# Patient Record
Sex: FEMALE | Race: BLACK OR AFRICAN AMERICAN
[De-identification: names, ages, dates, MRNs, and addresses within clinical notes are randomized per-mention and may not be internally consistent; named-entity substitution may affect disease eponyms.]

---

## 2017-01-16 ENCOUNTER — HOSPITAL ENCOUNTER (EMERGENCY)
Dept: HOSPITAL 17 - NEPB | Age: 27
Discharge: HOME | End: 2017-01-16
Payer: MEDICAID

## 2017-01-16 VITALS — DIASTOLIC BLOOD PRESSURE: 63 MMHG | SYSTOLIC BLOOD PRESSURE: 121 MMHG | TEMPERATURE: 98.2 F

## 2017-01-16 VITALS — BODY MASS INDEX: 30.22 KG/M2 | WEIGHT: 149.91 LBS | HEIGHT: 59 IN

## 2017-01-16 VITALS
HEART RATE: 71 BPM | DIASTOLIC BLOOD PRESSURE: 76 MMHG | SYSTOLIC BLOOD PRESSURE: 119 MMHG | OXYGEN SATURATION: 100 % | RESPIRATION RATE: 16 BRPM | TEMPERATURE: 98.3 F

## 2017-01-16 DIAGNOSIS — J06.9: Primary | ICD-10-CM

## 2017-01-16 PROCEDURE — 99284 EMERGENCY DEPT VISIT MOD MDM: CPT

## 2017-01-16 NOTE — PD
HPI


Chief Complaint:  Cold / Flu Symptoms


Time Seen by Provider:  21:12


Travel History


International Travel<30 days:  No


Contact w/Intl Traveler<30days:  No


Traveled to known affect area:  No





History of Present Illness


HPI


26-year-old black female presents from her department by EMS for evaluation of 

sore throat, difficulty swallowing and shortness of breath.  She states that 

she became sick on Friday.  She had a subjective fever.  She has had 

progressive difficulty swallowing.  She states that she had used her 's 

nasal spray without relief.  She states that it actually needed it worse.  She 

denies any significant cough or sputum production.  No nausea vomiting.  No 

abdominal pain or diarrhea.  No dysuria or frequency.





PFSH


Past Medical History


Medical History:  Denies Significant Hx


Hx Anticoagulant Therapy:  No


Cardiovascular Problems:  No


Chemotherapy:  No


Cerebrovascular Accident:  No


Diabetes:  No


Diminished Hearing:  No


Respiratory:  Yes (pna as a child)


Immunizations Current:  Yes


Tetanus Vaccination:  < 5 Years


Influenza Vaccination:  Yes


Pregnant?:  Not Pregnant


LMP:  17


:  5


Para:  4


Miscarriage:  0


:  1





Past Surgical History


 Section:  Yes





Social History


Alcohol Use:  No


Tobacco Use:  No


Substance Use:  No





Allergies-Medications


(Allergen,Severity, Reaction):  


Coded Allergies:  


     No Known Allergies (Unverified , 17)


Reported Meds & Prescriptions





Reported Meds & Active Scripts


Active








Review of Systems


Except as stated in HPI:  all other systems reviewed are Neg





Physical Exam


Narrative


GENERAL:  Well-developed, well-nourished in no acute distress. Nontoxic 

appearing.  Patient has a mild hoarse voice consistent with laryngitis.  Not 

hot potato.


HEAD: Normocephalic, atraumatic.


EYES: Pupils equal round and reactive. Extraocular motions intact. No scleral 

icterus. No injection or drainage. 


ENT: TMs clear without erythema.  The external auditory canals clear.  Nose: 

clear .  Posterior pharynx is pink and moist.  No tonsillar edema or exudate.  

Uvula midline. Airway patent.  Handling her secretions well.


NECK: Trachea midline.Supple, nontender, moves head freely.  No central bony 

tenderness or spasm.


CARDIOVASCULAR: Regular rate and rhythm without murmurs, gallops, or rubs. 


RESPIRATORY: Clear to auscultation. Breath sounds equal bilaterally. No wheezes

, rales, or rhonchi.  


GASTROINTESTINAL: Abdomen soft, non-tender, nondistended. No hepato-splenomegaly

, or palpable masses. No guarding.


EXTREMITIES: No clubbing, cyanosis, or edema. No joint tenderness, effusion, or 

edema noted. 


BACK: Nontender without deformity or crepitance. No flank tenderness.





Data


Data


Last Documented VS





Vital Signs








  Date Time  Temp Pulse Resp B/P Pulse Ox O2 Delivery O2 Flow Rate FiO2


 


17 20:30   16  100 Room Air  


 


17 20:27 98.3 71  119/76    








Orders





 Prednisone (Deltasone) (17 21:15)








[facility]


Medical Decision Making


Medical Screen Exam Complete:  Yes


Emergency Medical Condition:  Yes


Medical Record Reviewed:  Yes


Differential Diagnosis


MDM: High


Differential diagnoses: Strep throat, viral pharyngitis, mono, peritonsillar 

abscess, retropharyngeal abscess, Juvenal's angina


Narrative Course


This is a 26-year-old black female who is resting comfortably examination room.

  Her vital signs are stable.  Her exam is normal except for a minimally hoarse 

voice.  She is given prednisone 60 mg by mouth.  She is advised to use over-the-

counter Afrin.





This is URI, laryngitis





Diagnosis





 Primary Impression:  


 URI (upper respiratory infection)


 Qualified Code:  J06.9 - Viral upper respiratory tract infection


 Additional Impression:  


 Laryngitis


Patient Instructions:  General Instructions





***Additional Instructions:


Rest.


Force fluids.


Saltwater gargles.


Tylenol and Advil.


Chloraseptic Spray Cepastat lozenge.


Prednisone.


Afrin nasal spray for 4 days only.


Follow-up with a primary care doctor in 2-3 days.


Return to the ER if any problems.


***Med/Other Pt SpecificInfo:  Prescription(s) given


Scripts


Prednisone (Deltasone)20 Mg Tab20 Mg PO BID  #10 TAB


   Prov:Gracia Webb MD         17


Disposition:  01 DISCHARGE HOME


Condition:  Stable








Gil Domínguez 2017 21:17

## 2017-08-20 ENCOUNTER — HOSPITAL ENCOUNTER (INPATIENT)
Dept: HOSPITAL 17 - HOBED | Age: 27
LOS: 3 days | Discharge: LEFT BEFORE DISCHARGE | DRG: 778 | End: 2017-08-23
Attending: OBSTETRICS & GYNECOLOGY | Admitting: OBSTETRICS & GYNECOLOGY
Payer: MEDICAID

## 2017-08-20 VITALS — BODY MASS INDEX: 32.46 KG/M2 | HEIGHT: 59 IN | WEIGHT: 161 LBS

## 2017-08-20 VITALS — RESPIRATION RATE: 18 BRPM | TEMPERATURE: 98.6 F

## 2017-08-20 VITALS — DIASTOLIC BLOOD PRESSURE: 77 MMHG | SYSTOLIC BLOOD PRESSURE: 120 MMHG | HEART RATE: 89 BPM

## 2017-08-20 DIAGNOSIS — O34.219: ICD-10-CM

## 2017-08-20 DIAGNOSIS — O60.03: Primary | ICD-10-CM

## 2017-08-20 DIAGNOSIS — Z3A.32: ICD-10-CM

## 2017-08-20 DIAGNOSIS — O30.043: ICD-10-CM

## 2017-08-20 DIAGNOSIS — O40.3XX0: ICD-10-CM

## 2017-08-20 LAB
BACTERIA #/AREA URNS HPF: (no result) /HPF
COLOR UR: (no result)
COMMENT (UR): (no result)
CULTURE IF INDICATED: (no result)
GLUCOSE UR STRIP-MCNC: (no result) MG/DL
HGB UR QL STRIP: (no result)
KETONES UR STRIP-MCNC: (no result) MG/DL
NITRITE UR QL STRIP: (no result)
SP GR UR STRIP: 1.01 (ref 1–1.03)
SQUAMOUS #/AREA URNS HPF: 3 /HPF (ref 0–5)

## 2017-08-20 PROCEDURE — 87081 CULTURE SCREEN ONLY: CPT

## 2017-08-20 PROCEDURE — G0481 DRUG TEST DEF 8-14 CLASSES: HCPCS

## 2017-08-20 PROCEDURE — 81001 URINALYSIS AUTO W/SCOPE: CPT

## 2017-08-20 PROCEDURE — 59025 FETAL NON-STRESS TEST: CPT

## 2017-08-20 PROCEDURE — 82731 ASSAY OF FETAL FIBRONECTIN: CPT

## 2017-08-20 PROCEDURE — 86850 RBC ANTIBODY SCREEN: CPT

## 2017-08-20 PROCEDURE — 87150 DNA/RNA AMPLIFIED PROBE: CPT

## 2017-08-20 PROCEDURE — 80307 DRUG TEST PRSMV CHEM ANLYZR: CPT

## 2017-08-20 PROCEDURE — 86901 BLOOD TYPING SEROLOGIC RH(D): CPT

## 2017-08-20 PROCEDURE — 85025 COMPLETE CBC W/AUTO DIFF WBC: CPT

## 2017-08-20 PROCEDURE — 76816 OB US FOLLOW-UP PER FETUS: CPT

## 2017-08-20 PROCEDURE — 83735 ASSAY OF MAGNESIUM: CPT

## 2017-08-20 PROCEDURE — 86900 BLOOD TYPING SEROLOGIC ABO: CPT

## 2017-08-20 RX ADMIN — OXYTOCIN SCH MLS/HR: 10 INJECTION, SOLUTION INTRAMUSCULAR; INTRAVENOUS at 21:21

## 2017-08-20 RX ADMIN — OXYTOCIN SCH MLS/HR: 10 INJECTION, SOLUTION INTRAMUSCULAR; INTRAVENOUS at 23:41

## 2017-08-20 NOTE — PD
HPI


Travel History


International Travel<30 Days:  No


Contact w/Intl Traveler<30Days:  No


Known Affected Area:  No





History of Present Illness


HPI


This patient is a 26-year-old  6 para 5 EDC is 2017 at 32 

weeks and 1 days prenatal care with the [facility] and Dr. Charles, she 

presents the chief complaint of contractions.


The patient states the contractions began about 1 hour ago no rupture of 

membranes no vaginal bleeding the babies are active


Patient has a diamniotic dichorionic twin gestation both she states are vertex 

vertex


She has not time the contractions are very irregular hurts more when the baby 

moves improves when she is at rest


Denies any problems during the pregnancy


No headaches no blurred vision no nausea no vomiting mild diarrhea no 

constipation urinary frequency no urgency or hesitancy or feeling like she has 

a urinary tract infection


Previously admitted at [facility] for  contractions she was given 

terbutaline in the triage area 2 doses of steroids she states she got one shot 

and had to come back 24 hours later for the second shot.  This was done 

approximately 4 weeks ago, at 2 8 weeks per the patient.





Patient states that she is on Procardia 4 times a day








Prenatal records received from [facility] and reviewed EDC is 2017 placing the patient at 32 weeks and 1 day





History


Past Medical History


*** Narrative Medical


No known drug allergies history of an allergy to dust previous urinary tract 

infections


Seen for  contractions





Obstetric History


Obstetric History


First baby born 2009 female infant weight 6 lbs. 10 oz. vaginal 

delivery at term


Second baby born 2011 male infant weight 6 lbs. 4 oz. vaginal delivery 

at term


Third baby born 2012 female infant 5 lbs. 4 oz. born at 38 weeks


 #4 baby 2014 male infant weight approximately 5 pounds delivered by 

primary  secondary to nonreassuring fetal heart tracing


Fifth baby born 2016 female infant weight 6 lbs. 2 oz. repeat 





This twin gestation patient is scheduled for repeat  she states 







Past Surgical History


*** Narrative Surgical


 2





Family History


*** Narrative Family History


Father with hypertension





Social History


Alcohol Use:  No


Tobacco Use:  No


Substance Abuse:  No





Allergies-Medications


(Allergen,Severity, Reaction):  


Coded Allergies:  


     No Known Allergies (Unverified , 17)


Home Meds


Active Scripts


Prednisone (Deltasone) 20 Mg Tab, 20 MG PO BID, #10 TAB


   Prov:Gracia Webb MD         17





Review of Systems


Gastrointestinal:  Abdominal Pain (irregular contractions)





Physical Exam


Narrative


GENERAL: Well-nourished, well-developed patient.  Alert oriented 3 and 

cooperative in no acute distress


SKIN: Warm and dry.


HEAD: Normocephalic and atraumatic.


EYES: No scleral icterus. No injection or drainage. 


ENT: No nasal drainage noted. Mucous membranes pink. Airway patent.


NECK: Supple, trachea midline. No JVD.


CARDIOVASCULAR: Regular rate and rhythm without murmurs, gallops, or rubs. 


RESPIRATORY: Breath sounds equal bilaterally. No accessory muscle use.


ABDOMEN/GI: Gravid size greater than date secondary to twin gestation mild 

palpable contractions


   Gravid to [-] weeks size term size


   Fundal Height: [-]


GENITOURINARY: Speculum exam is done no fluid no blood thin white discharge 

cervix not visibly dilated


   External Genitalia: intact and normal in appearance


   BUS glands: [-]


   Cervix: [-] Posterior firm


   Dilatation: [-] Fingertip         


   Effacement: [-] Thick         


   Station: [-] First presenting part ballotable  


   Presentation: [-] Vertex/vertex per the patient       


   Membranes: [intact 


   Uterine Contractions: [-] Irregular


FHT's: 


   Category: [-]  1 


   Baseline: [-]  150/148 


   Reactive: [-]  Positive 


   Variability: [-] Moderate variability 


   Decels: [-]  0


EXTREMITIES: No cyanosis or edema.  2+ reflexes


NEUROLOGICAL: Awake and alert. Motor and sensory grossly within normal limits. 

Five out of 5 muscle strength in all muscle groups. Normal speech.





Data


Data


Vital Signs Reviewed:  Yes (blood pressures 120/77 pulse is 89 temperature is 

98.6)


Orders





 Orders


Vital Signs (Adult) .ON ADMISSION (17 21:21)


^ Labor Status (17 21:21)





MDM


Medical Record Reviewed:  Yes


Interpretation(s)


26-year-old  9 para 5035


Not in active labor


Diamniotic dichorionic twin gestation


Vertex vertex per the patient


Edward Ghosh


Rule out UTI


Rule out  labor


Narrative Course / MDM


Patient continues to have irregular contractions


mild


 states they have spaced out


will continue on the procardia 10 mg po q 6 hours


observations x 23 hours


both twins are category one


 iv fluid hydration


Visteril 50 mg po


 The cervix have been unchanged


ffn is negative 


culture not indicated on the urine





 if cervix is unchanged in the am will discharge home


Plan


Plan;


External fetal monitoring


IV fluid hydration with lactated Ringer's 125 cc an hour bolus 300 cc


Fetal fibronectin


Urinalysis





Reevaluation


Dose of Procardia 10 mg by mouth











Hazel Marquez MD Aug 20, 2017 21:35

## 2017-08-21 VITALS — SYSTOLIC BLOOD PRESSURE: 114 MMHG | HEART RATE: 104 BPM | DIASTOLIC BLOOD PRESSURE: 59 MMHG

## 2017-08-21 VITALS — SYSTOLIC BLOOD PRESSURE: 98 MMHG | HEART RATE: 86 BPM | DIASTOLIC BLOOD PRESSURE: 53 MMHG

## 2017-08-21 VITALS — RESPIRATION RATE: 18 BRPM

## 2017-08-21 VITALS — HEART RATE: 96 BPM | DIASTOLIC BLOOD PRESSURE: 62 MMHG | SYSTOLIC BLOOD PRESSURE: 113 MMHG | RESPIRATION RATE: 18 BRPM

## 2017-08-21 VITALS — DIASTOLIC BLOOD PRESSURE: 71 MMHG | SYSTOLIC BLOOD PRESSURE: 116 MMHG | HEART RATE: 103 BPM

## 2017-08-21 VITALS — DIASTOLIC BLOOD PRESSURE: 51 MMHG | HEART RATE: 93 BPM | SYSTOLIC BLOOD PRESSURE: 103 MMHG

## 2017-08-21 VITALS — SYSTOLIC BLOOD PRESSURE: 110 MMHG | DIASTOLIC BLOOD PRESSURE: 65 MMHG | HEART RATE: 94 BPM

## 2017-08-21 VITALS — DIASTOLIC BLOOD PRESSURE: 52 MMHG | HEART RATE: 101 BPM | SYSTOLIC BLOOD PRESSURE: 114 MMHG

## 2017-08-21 VITALS — SYSTOLIC BLOOD PRESSURE: 100 MMHG | DIASTOLIC BLOOD PRESSURE: 55 MMHG | HEART RATE: 92 BPM

## 2017-08-21 VITALS — SYSTOLIC BLOOD PRESSURE: 104 MMHG | DIASTOLIC BLOOD PRESSURE: 54 MMHG | HEART RATE: 74 BPM

## 2017-08-21 VITALS — SYSTOLIC BLOOD PRESSURE: 104 MMHG | DIASTOLIC BLOOD PRESSURE: 56 MMHG | HEART RATE: 97 BPM

## 2017-08-21 VITALS — DIASTOLIC BLOOD PRESSURE: 69 MMHG | HEART RATE: 102 BPM | SYSTOLIC BLOOD PRESSURE: 109 MMHG

## 2017-08-21 VITALS — HEART RATE: 98 BPM | SYSTOLIC BLOOD PRESSURE: 100 MMHG | DIASTOLIC BLOOD PRESSURE: 58 MMHG

## 2017-08-21 VITALS — SYSTOLIC BLOOD PRESSURE: 110 MMHG | HEART RATE: 95 BPM | RESPIRATION RATE: 16 BRPM | DIASTOLIC BLOOD PRESSURE: 63 MMHG

## 2017-08-21 VITALS — SYSTOLIC BLOOD PRESSURE: 103 MMHG | HEART RATE: 85 BPM | DIASTOLIC BLOOD PRESSURE: 59 MMHG

## 2017-08-21 VITALS — HEART RATE: 95 BPM | SYSTOLIC BLOOD PRESSURE: 96 MMHG | DIASTOLIC BLOOD PRESSURE: 42 MMHG

## 2017-08-21 VITALS — DIASTOLIC BLOOD PRESSURE: 44 MMHG | SYSTOLIC BLOOD PRESSURE: 105 MMHG | HEART RATE: 114 BPM

## 2017-08-21 VITALS — DIASTOLIC BLOOD PRESSURE: 67 MMHG | SYSTOLIC BLOOD PRESSURE: 109 MMHG | HEART RATE: 97 BPM

## 2017-08-21 VITALS — TEMPERATURE: 98.5 F | SYSTOLIC BLOOD PRESSURE: 111 MMHG | DIASTOLIC BLOOD PRESSURE: 50 MMHG | HEART RATE: 93 BPM

## 2017-08-21 VITALS — SYSTOLIC BLOOD PRESSURE: 107 MMHG | HEART RATE: 96 BPM | DIASTOLIC BLOOD PRESSURE: 60 MMHG

## 2017-08-21 VITALS — DIASTOLIC BLOOD PRESSURE: 65 MMHG | HEART RATE: 93 BPM | SYSTOLIC BLOOD PRESSURE: 103 MMHG

## 2017-08-21 VITALS — DIASTOLIC BLOOD PRESSURE: 42 MMHG | SYSTOLIC BLOOD PRESSURE: 96 MMHG | HEART RATE: 93 BPM

## 2017-08-21 VITALS — DIASTOLIC BLOOD PRESSURE: 59 MMHG | SYSTOLIC BLOOD PRESSURE: 110 MMHG | HEART RATE: 95 BPM

## 2017-08-21 VITALS — RESPIRATION RATE: 16 BRPM | TEMPERATURE: 98.8 F

## 2017-08-21 VITALS — SYSTOLIC BLOOD PRESSURE: 112 MMHG | DIASTOLIC BLOOD PRESSURE: 57 MMHG | HEART RATE: 85 BPM

## 2017-08-21 VITALS — SYSTOLIC BLOOD PRESSURE: 115 MMHG | DIASTOLIC BLOOD PRESSURE: 59 MMHG | HEART RATE: 85 BPM

## 2017-08-21 VITALS — DIASTOLIC BLOOD PRESSURE: 62 MMHG | SYSTOLIC BLOOD PRESSURE: 105 MMHG | HEART RATE: 82 BPM

## 2017-08-21 VITALS — SYSTOLIC BLOOD PRESSURE: 102 MMHG | HEART RATE: 101 BPM | DIASTOLIC BLOOD PRESSURE: 51 MMHG

## 2017-08-21 VITALS — DIASTOLIC BLOOD PRESSURE: 62 MMHG | HEART RATE: 96 BPM | SYSTOLIC BLOOD PRESSURE: 107 MMHG

## 2017-08-21 VITALS — TEMPERATURE: 98 F

## 2017-08-21 VITALS — HEART RATE: 99 BPM | DIASTOLIC BLOOD PRESSURE: 44 MMHG | SYSTOLIC BLOOD PRESSURE: 107 MMHG

## 2017-08-21 VITALS — HEART RATE: 89 BPM | DIASTOLIC BLOOD PRESSURE: 56 MMHG | SYSTOLIC BLOOD PRESSURE: 106 MMHG

## 2017-08-21 VITALS — SYSTOLIC BLOOD PRESSURE: 107 MMHG | HEART RATE: 96 BPM | DIASTOLIC BLOOD PRESSURE: 52 MMHG

## 2017-08-21 VITALS — TEMPERATURE: 97.7 F

## 2017-08-21 VITALS — HEART RATE: 89 BPM | RESPIRATION RATE: 18 BRPM | SYSTOLIC BLOOD PRESSURE: 108 MMHG | DIASTOLIC BLOOD PRESSURE: 63 MMHG

## 2017-08-21 LAB
BASOPHILS # BLD AUTO: 0.1 TH/MM3 (ref 0–0.2)
BASOPHILS NFR BLD: 0.6 % (ref 0–2)
EOSINOPHIL # BLD: 0.7 TH/MM3 (ref 0–0.4)
EOSINOPHIL NFR BLD: 8 % (ref 0–4)
ERYTHROCYTE [DISTWIDTH] IN BLOOD BY AUTOMATED COUNT: 14.6 % (ref 11.6–17.2)
HCT VFR BLD CALC: 30.1 % (ref 35–46)
HEMO FLAGS: (no result)
LYMPHOCYTES # BLD AUTO: 1.4 TH/MM3 (ref 1–4.8)
LYMPHOCYTES NFR BLD AUTO: 16.3 % (ref 9–44)
MCH RBC QN AUTO: 28.4 PG (ref 27–34)
MCHC RBC AUTO-ENTMCNC: 32.2 % (ref 32–36)
MCV RBC AUTO: 88.3 FL (ref 80–100)
MONOCYTES NFR BLD: 11.4 % (ref 0–8)
NEUTROPHILS # BLD AUTO: 5.6 TH/MM3 (ref 1.8–7.7)
NEUTROPHILS NFR BLD AUTO: 63.7 % (ref 16–70)
PLATELET # BLD: 132 TH/MM3 (ref 150–450)
RBC # BLD AUTO: 3.41 MIL/MM3 (ref 4–5.3)
WBC # BLD AUTO: 8.8 TH/MM3 (ref 4–11)

## 2017-08-21 RX ADMIN — BETAMETHASONE ACETATE AND BETAMETHASONE SODIUM PHOSPHATE SCH MG: 3; 3 INJECTION, SUSPENSION INTRA-ARTICULAR; INTRALESIONAL; INTRAMUSCULAR; SOFT TISSUE at 02:11

## 2017-08-21 RX ADMIN — OXYTOCIN SCH MLS/HR: 10 INJECTION, SOLUTION INTRAMUSCULAR; INTRAVENOUS at 15:50

## 2017-08-21 RX ADMIN — OXYTOCIN SCH MLS/HR: 10 INJECTION, SOLUTION INTRAMUSCULAR; INTRAVENOUS at 01:51

## 2017-08-21 RX ADMIN — OXYTOCIN SCH MLS/HR: 10 INJECTION, SOLUTION INTRAMUSCULAR; INTRAVENOUS at 05:21

## 2017-08-21 RX ADMIN — Medication SCH ML: at 21:00

## 2017-08-21 RX ADMIN — OXYTOCIN SCH MLS/HR: 10 INJECTION, SOLUTION INTRAMUSCULAR; INTRAVENOUS at 01:59

## 2017-08-21 RX ADMIN — Medication SCH ML: at 09:00

## 2017-08-21 RX ADMIN — OXYTOCIN SCH MLS/HR: 10 INJECTION, SOLUTION INTRAMUSCULAR; INTRAVENOUS at 09:51

## 2017-08-21 RX ADMIN — MAGNESIUM SULFATE IN WATER SCH MLS/HR: 40 INJECTION, SOLUTION INTRAVENOUS at 22:13

## 2017-08-21 RX ADMIN — MAGNESIUM SULFATE IN WATER SCH MLS/HR: 40 INJECTION, SOLUTION INTRAVENOUS at 01:51

## 2017-08-21 NOTE — HHI.PR
Subjective


Remarks


Patient complaining of increase in the contractions.  They have become more 

regular q 2  min





Objective


-


the cervix is not more dilated still 1 cm more effaced now 75 % ballotable 

vertex on the first twin


Result Diagram:  


17 2350








A/P


Assessment and Plan


33 weeks


twin gestation


 contractions





plan:


will start magnesium sulfate


    4 gram loading dose


    2 grams per hour


fentanyl for pain 50 mg.


Hernandez cath for monitoring of urine out put\


close monitoring











Hazel Marquez MD Aug 21, 2017 01:42

## 2017-08-21 NOTE — PD.OB.ANTE
Subjective


Interval History


No acute issues overnight.  Vitals are stable, patient remains afebrile.  She 

is feeling painful contractions every 5-10 minutes.  She is feeling increasing 

pelvic pressure.  She denies any fever, chills, chest pain, shortness of breath

, leg pain.  She does feel the urge to have a bowel movement.


Antepartum ROS:  Reports: New complaints (pelvic pressure/contractions), Fetal 

movement normal, Contractions, 


   Denies: Loss of fluid, Vaginal bleeding (Enid Butler MD, R3)





Objective


Lab & Micro Results











Test


  17


21:15 17


23:50


 


Urine Color LIGHT-YELLOW  


 


Urine Turbidity HAZY  


 


Urine pH 6.0  


 


Urine Specific Gravity 1.008  


 


Urine Protein NEG mg/dL  


 


Urine Glucose (UA) NEG mg/dL  


 


Urine Ketones NEG mg/dL  


 


Urine Occult Blood NEG  


 


Urine Nitrite NEG  


 


Urine Bilirubin NEG  


 


Urine Urobilinogen


  LESS THAN 2.0


MG/DL 


 


 


Urine Leukocyte Esterase LARGE  


 


Urine RBC 2 /hpf  


 


Urine WBC 5 /hpf  


 


Urine Squamous Epithelial


Cells 3 /hpf 


  


 


 


Urine Amorphous Sediment RARE  


 


Urine Bacteria OCC /hpf  


 


Microscopic Urinalysis Comment


  CULT NOT


INDICATED 


 


 


Fetal Fibronectin NEGATIVE  


 


Urine Opiates Screen NEG  


 


Urine Barbiturates Screen NEG  


 


Urine Amphetamines Screen NEG  


 


Urine Benzodiazepines Screen NEG  


 


Urine Cocaine Screen NEG  


 


Urine Cannabinoids Screen NEG  


 


White Blood Count  8.8 TH/MM3 


 


Red Blood Count  3.41 MIL/MM3 


 


Hemoglobin  9.7 GM/DL 


 


Hematocrit  30.1 % 


 


Mean Corpuscular Volume  88.3 FL 


 


Mean Corpuscular Hemoglobin  28.4 PG 


 


Mean Corpuscular Hemoglobin


Concent 


  32.2 % 


 


 


Red Cell Distribution Width  14.6 % 


 


Platelet Count  132 TH/MM3 


 


Mean Platelet Volume  11.1 FL 


 


Neutrophils (%) (Auto)  63.7 % 


 


Lymphocytes (%) (Auto)  16.3 % 


 


Monocytes (%) (Auto)  11.4 % 


 


Eosinophils (%) (Auto)  8.0 % 


 


Basophils (%) (Auto)  0.6 % 


 


Neutrophils # (Auto)  5.6 TH/MM3 


 


Lymphocytes # (Auto)  1.4 TH/MM3 


 


Monocytes # (Auto)  1.0 TH/MM3 


 


Eosinophils # (Auto)  0.7 TH/MM3 


 


Basophils # (Auto)  0.1 TH/MM3 


 


CBC Comment  DIFF FINAL 


 


Differential Comment   








Physical Exam


GENERAL: Well-nourished, well-developed patient.


CARDIOVASCULAR: Regular rate and rhythm without murmurs, gallops, or rubs.


RESPIRATORY: Breath sounds equal bilaterally. No accessory muscle use.


ABDOMEN/GI: Abdomen soft, non-tender.


  Fundus: 32


GENITOURINARY:


External Genitalia: intact and normal in appearance


  Cervix: anterior


  Dilatation: 1-2


  Effacement: 70


  Station: -2


  Presentation: vertex


  Membranes: intact


  Uterine Contractions: q7-8min


FHT's:


TWIN A


  Category: I 


  Baseline: 135


  Reactive: +


  Variability: moderate


  Decels: none


TWIN B


  Category: I 


  Baseline: 135


  Reactive: +


  Variability: moderate


  Decels: none


EXTREMITIES: No cyanosis or edema, non-tender, without signs of DVT.


 (Enid Butler MD, R3)





Assessment and Plan


Problem List:  


(1) Twin gestation in third trimester


ICD Codes:  O30.003 - Twin pregnancy, unspecified number of placenta and 

unspecified number of amniotic sacs, third trimester


Qualifiers:  


   Qualified Codes:  O30.043 - Twin pregnancy, dichorionic/diamniotic, third 

trimester


(2)  labor in third trimester


ICD Codes:  O60.03 -  labor without delivery, third trimester


Qualifiers:  


   Qualified Codes:  O60.03 -  labor without delivery, third trimester


Assessment and Plan


26 year old  at 32/2/7 weeks gestation





1. IUP- twin gestation, Category I tracing on both twins, reassuring.


2.  Labor- 


-Regular contractions 


-Cervical change noted, now at 1-/-1


-Continue Magnesium Sulfate for fetal neuroprotection


-Obtain US for EFW and position


-Betamethasone 12mg IM Q24H x 2 (first dose given  at 0211)


-Continue Procardia 10mg PO Q6H


-Continue Penicillin per protocol for GBS prophylaxis. Will obtain GBS PCR 

today.


- s/p  x 2, will need a repeat  as labor progresses. Will 

continue to monitor closely





sdw Dr. Aldridge and Dr. Magallon R1


 (Enid Butler MD, R3)





Addendum


Remarks


I rounded on the patient. I rounded with the resident. I reviewed the resident'

s assessment and plan of care for this patient. I am in agreement with the plan 

of care for this patient.


 (Hazel Marquez MD)











Enid Butler MD, R3 Aug 21, 2017 08:09


Hazel Marquez MD Aug 21, 2017 08:56

## 2017-08-22 VITALS — HEART RATE: 91 BPM | SYSTOLIC BLOOD PRESSURE: 93 MMHG | DIASTOLIC BLOOD PRESSURE: 40 MMHG

## 2017-08-22 VITALS — HEART RATE: 80 BPM | DIASTOLIC BLOOD PRESSURE: 56 MMHG | SYSTOLIC BLOOD PRESSURE: 115 MMHG

## 2017-08-22 VITALS — DIASTOLIC BLOOD PRESSURE: 55 MMHG | SYSTOLIC BLOOD PRESSURE: 103 MMHG | RESPIRATION RATE: 18 BRPM | HEART RATE: 78 BPM

## 2017-08-22 VITALS — DIASTOLIC BLOOD PRESSURE: 46 MMHG | SYSTOLIC BLOOD PRESSURE: 111 MMHG | HEART RATE: 100 BPM

## 2017-08-22 VITALS — DIASTOLIC BLOOD PRESSURE: 64 MMHG | SYSTOLIC BLOOD PRESSURE: 106 MMHG | RESPIRATION RATE: 16 BRPM | HEART RATE: 93 BPM

## 2017-08-22 VITALS — HEART RATE: 85 BPM | DIASTOLIC BLOOD PRESSURE: 48 MMHG | SYSTOLIC BLOOD PRESSURE: 99 MMHG

## 2017-08-22 VITALS — HEART RATE: 83 BPM | SYSTOLIC BLOOD PRESSURE: 106 MMHG | DIASTOLIC BLOOD PRESSURE: 58 MMHG

## 2017-08-22 VITALS — SYSTOLIC BLOOD PRESSURE: 109 MMHG | RESPIRATION RATE: 18 BRPM | HEART RATE: 76 BPM | DIASTOLIC BLOOD PRESSURE: 58 MMHG

## 2017-08-22 VITALS — RESPIRATION RATE: 18 BRPM | SYSTOLIC BLOOD PRESSURE: 96 MMHG | HEART RATE: 89 BPM | DIASTOLIC BLOOD PRESSURE: 53 MMHG

## 2017-08-22 VITALS — SYSTOLIC BLOOD PRESSURE: 109 MMHG | HEART RATE: 79 BPM | DIASTOLIC BLOOD PRESSURE: 59 MMHG

## 2017-08-22 VITALS
HEART RATE: 95 BPM | DIASTOLIC BLOOD PRESSURE: 47 MMHG | TEMPERATURE: 98.1 F | SYSTOLIC BLOOD PRESSURE: 101 MMHG | RESPIRATION RATE: 18 BRPM

## 2017-08-22 VITALS — HEART RATE: 78 BPM | DIASTOLIC BLOOD PRESSURE: 58 MMHG | SYSTOLIC BLOOD PRESSURE: 104 MMHG

## 2017-08-22 VITALS — DIASTOLIC BLOOD PRESSURE: 60 MMHG | SYSTOLIC BLOOD PRESSURE: 101 MMHG | HEART RATE: 83 BPM

## 2017-08-22 VITALS — RESPIRATION RATE: 17 BRPM | TEMPERATURE: 98 F

## 2017-08-22 VITALS — HEART RATE: 81 BPM | SYSTOLIC BLOOD PRESSURE: 101 MMHG | DIASTOLIC BLOOD PRESSURE: 60 MMHG

## 2017-08-22 VITALS — SYSTOLIC BLOOD PRESSURE: 106 MMHG | DIASTOLIC BLOOD PRESSURE: 58 MMHG | HEART RATE: 77 BPM

## 2017-08-22 VITALS — HEART RATE: 101 BPM | DIASTOLIC BLOOD PRESSURE: 49 MMHG | SYSTOLIC BLOOD PRESSURE: 100 MMHG

## 2017-08-22 VITALS — SYSTOLIC BLOOD PRESSURE: 95 MMHG | DIASTOLIC BLOOD PRESSURE: 50 MMHG | HEART RATE: 90 BPM

## 2017-08-22 VITALS — HEART RATE: 82 BPM | DIASTOLIC BLOOD PRESSURE: 53 MMHG | SYSTOLIC BLOOD PRESSURE: 100 MMHG

## 2017-08-22 VITALS — RESPIRATION RATE: 18 BRPM | TEMPERATURE: 98 F

## 2017-08-22 VITALS — RESPIRATION RATE: 16 BRPM

## 2017-08-22 VITALS — RESPIRATION RATE: 18 BRPM

## 2017-08-22 RX ADMIN — Medication SCH ML: at 09:00

## 2017-08-22 RX ADMIN — BETAMETHASONE ACETATE AND BETAMETHASONE SODIUM PHOSPHATE SCH MG: 3; 3 INJECTION, SUSPENSION INTRA-ARTICULAR; INTRALESIONAL; INTRAMUSCULAR; SOFT TISSUE at 02:02

## 2017-08-22 RX ADMIN — Medication SCH ML: at 21:00

## 2017-08-22 NOTE — PD.OB.ANTE
Subjective


Diagnosis:  


(1) Twin gestation in third trimester


(2)  labor in third trimester


Interval History


No acute issues overnight.  Vitals are stable, patient remains afebrile.  She 

continues to note about 3 contractions every hour.  She denies any vaginal 

bleeding or discharge. No gush or leaking of fluid.  Positive fetal movement.  

No chest pain, shortness of breath, or leg pain.


Antepartum ROS:  Reports: Fetal movement normal, Contractions, 


   Denies: New complaints, Loss of fluid, Vaginal bleeding





Objective


Vital Signs





Vital Signs








  Date Time  Temp Pulse Resp B/P (MAP) Pulse Ox O2 Delivery O2 Flow Rate FiO2


 


17 06:00   16     


 


17 06:00  93  106/64 (78)    


 


17 05:00  83  101/60 (74)    


 


17 04:03  80  115/56 (75)    


 


17 04:00   18     


 


17 03:00  82  100/53 (69)    


 


17 02:00   18     


 


17 02:00  89  96/53 (67)    


 


17 01:00  91  93/40 (57)    


 


17 00:00  101  100/49 (66)    


 


17 23:37   18     


 


17 23:00 98.5 93  111/50 (70)    


 


17 22:00  96  107/52 (70)    


 


17 21:56   18     


 


17 21:00  95  110/59 (76)    


 


17 20:00  96  107/60 (76)    


 


17 19:27   18     


 


17 19:00  101  114/52 (72)    


 


17 18:00  101  102/51 (68)    


 


17 17:00  99  107/44 (65)    


 


17 16:00  93  96/42 (60)    


 


17 15:01  97  109/67 (81)    


 


17 14:59 98.8  16     


 


17 14:00  94  110/65 (80)    


 


17 13:00  96  113/62 (79)    


 


17 13:00   18     


 


17 12:00  93  103/51 (68)    


 


17 11:03  102  109/69 (82)    


 


17 10:36  95 16 110/63 (79)    


 


17 08:20 97.7       


 


17 08:00  103  116/71 (86)    


 


17 07:42  104  114/59 (77)    


 


17 07:41   18     








Lab & Micro Results











Test


  17


08:20


 


Group B Streptococcus (PCR) NEGATIVE 














 Date/Time


Source Procedure


Growth Status


 


 


 17 08:20


Genital Genital Region Group B Streptococcus Screen


Pending Received








Physical Exam


GENERAL: Well-nourished, well-developed patient.


CARDIOVASCULAR: Regular rate and rhythm without murmurs, gallops, or rubs.


RESPIRATORY: Breath sounds equal bilaterally. No accessory muscle use.


ABDOMEN/GI: Abdomen soft, non-tender.


  Fundus: 32


GENITOURINARY:


External Genitalia: intact and normal in appearance


  Cervix: [-]


  Dilatation: [-]


  Effacement: [-]


  Station: [-]


  Presentation: vertex-vertex


  Membranes: intact


  Uterine Contractions: Q20min


FHT's Twin A:


  Category: I


  Baseline: 130


  Reactive: +


  Variability: moderate


  Decels: none


FHT's Twin B:


  Category: I


  Baseline: 135


  Reactive: +


  Variability: moderate


  Decels: none


EXTREMITIES: No cyanosis or edema, non-tender, without signs of DVT.





Assessment and Plan


Problem List:  


(1) Twin gestation in third trimester


ICD Codes:  O30.003 - Twin pregnancy, unspecified number of placenta and 

unspecified number of amniotic sacs, third trimester


Qualifiers:  


   Qualified Codes:  O30.043 - Twin pregnancy, dichorionic/diamniotic, third 

trimester


(2)  labor in third trimester


ICD Codes:  O60.03 -  labor without delivery, third trimester


Qualifiers:  


   Qualified Codes:  O60.03 -  labor without delivery, third trimester


Assessment and Plan


26 year old  at 32-3/7 weeks gestation





1. IUP- twin gestation, Category I tracing on both twins, reassuring.


2.  Labor 


-Contractions have decreased in frequency


-Will DC Magnesium Sulfate


- US shows Twin A: Cephalic, AGA, EFW 1744g, Twin B: Cephalic, AGA, EFW 

1772g


-s/p Betamethasone 12mg IM Q24H x 2 (-)


-GBS negative


-s/p  x 2, will need a repeat  if labor progresses. Will 

continue to monitor closely





dw Dr. Pryor and Dr. Magallon R1











Enid Butler MD, R3 Aug 22, 2017 07:14

## 2017-08-22 NOTE — PD.OB.ANTE
Subjective


Diagnosis:  


(1) Twin gestation in third trimester


(2)  labor in third trimester


(3) 32 weeks gestation of pregnancy


Interval History


Patient reports irregular contractions.  Reports good fetal movement x 2.





Objective


Vital Signs





Vital Signs








  Date Time  Temp Pulse Resp B/P (MAP) Pulse Ox O2 Delivery O2 Flow Rate FiO2


 


17 22:00  79  109/59 (76)    


 


17 21:35  81  101/60 (74)    


 


17 21:30  77  106/58 (74)    


 


17 21:25  83  106/58 (74)    


 


17 21:20  78  104/58 (73)    


 


17 21:15  76 18 109/58 (75)    


 


17 21:00 98.0       


 


17 21:00   18     


 


17 15:47  90  95/50 (65)    


 


17 15:46 98.0  17     


 


17 12:50   18     


 


17 12:50  95  101/47 (65)    


 


17 12:50 98.1       


 


17 08:00   16     


 


17 07:56  100  111/46 (67)    


 


17 07:00  85  99/48 (65)    


 


17 06:00   16     


 


17 06:00  93  106/64 (78)    


 


17 05:00  83  101/60 (74)    


 


17 04:03  80  115/56 (75)    


 


17 04:00   18     


 


17 03:00  82  100/53 (69)    


 


17 02:00   18     


 


17 02:00  89  96/53 (67)    


 


17 01:00  91  93/40 (57)    


 


17 00:00  101  100/49 (66)    


 


17 23:37   18     


 


17 23:00 98.5 93  111/50 (70)    








Lab & Micro Results











 Date/Time


Source Procedure


Growth Status


 


 


 17 08:20


Genital Genital Region Group B Streptococcus Screen - Preliminary


RESULTS PENDING Resulted








Physical Exam


GENERAL: Well-nourished, well-developed patient.


CARDIOVASCULAR: Regular rate and rhythm without murmurs, gallops, or rubs.


RESPIRATORY: Breath sounds equal bilaterally. No accessory muscle use.


ABDOMEN/GI: Abdomen soft, non-tender.


  Fundus: [-]


GENITOURINARY:


External Genitalia: intact and normal in appearance


  Cervix: [per RN exam 3-/-2]


  Dilatation: [-]


  Effacement: [-]


  Station: [-]


  Presentation: [-]


  Membranes: [-]


  Uterine Contractions: [irregular contractions, every 7-11 minutes]


FHT's:


  Category: [1, overall reassuring]


  Baseline: [Twin A 150s, Twin B 140s]


  Reactive: [-]


  Variability: [moderate]


  Decels: [none]


EXTREMITIES: No cyanosis or edema, non-tender, without signs of DVT.





Assessment and Plan


Problem List:  


(1) Twin gestation in third trimester


ICD Codes:  O30.003 - Twin pregnancy, unspecified number of placenta and 

unspecified number of amniotic sacs, third trimester


Qualifiers:  


   Qualified Codes:  O30.043 - Twin pregnancy, dichorionic/diamniotic, third 

trimester


(2)  labor in third trimester


ICD Codes:  O60.03 -  labor without delivery, third trimester


Qualifiers:  


   Qualified Codes:  O60.03 -  labor without delivery, third trimester


Assessment and Plan


26 year old  at 32-3/7 weeks gestation





1. IUP- twin gestation, Category I tracing on both twins, reassuring.


2.  Labor- Will begin Magnesium Sulfate and monitor closely.


Plan d/w patient at the bedside.  All questions answered.  








- US shows Twin A: Cephalic, AGA, EFW 1744g, Twin B: Cephalic, AGA, EFW 

1772g


-s/p Betamethasone 12mg IM Q24H x 2 (-)


-s/p  x 2, will need a repeat  if labor progresses. Will 

continue to monitor closely











Anna Kumar MD Aug 22, 2017 23:00

## 2017-08-23 VITALS — HEART RATE: 63 BPM | SYSTOLIC BLOOD PRESSURE: 94 MMHG | DIASTOLIC BLOOD PRESSURE: 46 MMHG

## 2017-08-23 VITALS
TEMPERATURE: 98 F | HEART RATE: 73 BPM | DIASTOLIC BLOOD PRESSURE: 57 MMHG | RESPIRATION RATE: 18 BRPM | SYSTOLIC BLOOD PRESSURE: 109 MMHG

## 2017-08-23 VITALS — DIASTOLIC BLOOD PRESSURE: 52 MMHG | RESPIRATION RATE: 18 BRPM | SYSTOLIC BLOOD PRESSURE: 99 MMHG | HEART RATE: 77 BPM

## 2017-08-23 VITALS — RESPIRATION RATE: 16 BRPM

## 2017-08-23 VITALS — SYSTOLIC BLOOD PRESSURE: 98 MMHG | DIASTOLIC BLOOD PRESSURE: 51 MMHG | RESPIRATION RATE: 18 BRPM | HEART RATE: 79 BPM

## 2017-08-23 VITALS — DIASTOLIC BLOOD PRESSURE: 59 MMHG | SYSTOLIC BLOOD PRESSURE: 106 MMHG | HEART RATE: 80 BPM

## 2017-08-23 VITALS — SYSTOLIC BLOOD PRESSURE: 110 MMHG | DIASTOLIC BLOOD PRESSURE: 65 MMHG | HEART RATE: 76 BPM

## 2017-08-23 VITALS — DIASTOLIC BLOOD PRESSURE: 66 MMHG | SYSTOLIC BLOOD PRESSURE: 110 MMHG | HEART RATE: 72 BPM

## 2017-08-23 VITALS — TEMPERATURE: 97.7 F

## 2017-08-23 VITALS — RESPIRATION RATE: 18 BRPM | DIASTOLIC BLOOD PRESSURE: 56 MMHG | SYSTOLIC BLOOD PRESSURE: 110 MMHG | HEART RATE: 73 BPM

## 2017-08-23 VITALS — SYSTOLIC BLOOD PRESSURE: 106 MMHG | DIASTOLIC BLOOD PRESSURE: 58 MMHG | HEART RATE: 78 BPM

## 2017-08-23 VITALS — SYSTOLIC BLOOD PRESSURE: 85 MMHG | RESPIRATION RATE: 18 BRPM | HEART RATE: 75 BPM | DIASTOLIC BLOOD PRESSURE: 38 MMHG

## 2017-08-23 VITALS — SYSTOLIC BLOOD PRESSURE: 105 MMHG | DIASTOLIC BLOOD PRESSURE: 66 MMHG | HEART RATE: 70 BPM

## 2017-08-23 VITALS — RESPIRATION RATE: 18 BRPM

## 2017-08-23 VITALS — HEART RATE: 86 BPM | SYSTOLIC BLOOD PRESSURE: 104 MMHG | DIASTOLIC BLOOD PRESSURE: 55 MMHG

## 2017-08-23 VITALS — DIASTOLIC BLOOD PRESSURE: 44 MMHG | HEART RATE: 72 BPM | RESPIRATION RATE: 18 BRPM | SYSTOLIC BLOOD PRESSURE: 85 MMHG

## 2017-08-23 RX ADMIN — PENICILLIN G POTASSIUM SCH MLS/HR: 5000000 INJECTION, POWDER, FOR SOLUTION INTRAMUSCULAR; INTRAVENOUS at 00:00

## 2017-08-23 RX ADMIN — PENICILLIN G POTASSIUM SCH MLS/HR: 5000000 INJECTION, POWDER, FOR SOLUTION INTRAMUSCULAR; INTRAVENOUS at 04:00

## 2017-08-23 NOTE — PD.OB.ANTE
Subjective


Diagnosis:  


(1) Twin gestation in third trimester


(2)  labor in third trimester


Diagnosis:  Principal





(3) 32 weeks gestation of pregnancy


Interval History


Patient in pain and feeling contractions overnight.  Contractions have 

decreased in frequency and intensity since resuming the magnesium.  She denies 

any vaginal bleeding or discharge.  No gush or leaking of fluid.  Positive 

fetal movement.  She is strongly considering leaving [place] and going to [place] 

where her OB/GYN is.  Vitals are stable, patient remains afebrile.


Antepartum ROS:  Reports: Fetal movement normal, Contractions, 


   Denies: New complaints, Loss of fluid, Vaginal bleeding (Enid Butler MD

, R3)





Objective


Vital Signs





Vital Signs








  Date Time  Temp Pulse Resp B/P (MAP) Pulse Ox O2 Delivery O2 Flow Rate FiO2


 


17 07:10   18     


 


17 07:00  86  104/55 (71)    


 


17 06:00  72 18 85/44 (58)    


 


17 05:00  63  94/46 (62)    


 


17 04:52   18     


 


17 04:00  79  98/51 (67)    


 


17 04:00   18     


 


17 03:00   18     


 


17 03:00  75  85/38 (54)    


 


17 02:00   18     


 


17 02:00  77  99/52 (68)    


 


17 01:00   18     


 


17 01:00  73  110/56 (74)    


 


17 00:00 98.0       


 


17 00:00   18     


 


17 00:00  73  109/57 (74)    


 


17 23:03   18     


 


17 23:00  78  103/55 (71)    


 


17 23:00   18     


 


17 22:00  79  109/59 (76)    


 


17 21:35  81  101/60 (74)    


 


17 21:30  77  106/58 (74)    


 


17 21:25  83  106/58 (74)    


 


17 21:20  78  104/58 (73)    


 


17 21:15  76 18 109/58 (75)    


 


17 21:00 98.0       


 


17 21:00   18     


 


17 15:47  90  95/50 (65)    


 


17 15:46 98.0  17     


 


17 12:50   18     


 


17 12:50  95  101/47 (65)    


 


17 12:50 98.1       


 


17 08:00   16     


 


17 07:56  100  111/46 (67)    








Lab & Micro Results











Test


  17


03:50


 


Magnesium Level 5.1 MG/DL 














 Date/Time


Source Procedure


Growth Status


 


 


 17 08:20


Genital Genital Region Group B Streptococcus Screen - Preliminary


RESULTS PENDING Resulted








Physical Exam


GENERAL: Well-nourished, well-developed patient.


CARDIOVASCULAR: Regular rate and rhythm without murmurs, gallops, or rubs.


RESPIRATORY: Breath sounds equal bilaterally. No accessory muscle use.


ABDOMEN/GI: Abdomen soft, non-tender.


  Fundus: 32


GENITOURINARY:


External Genitalia: intact and normal in appearance


  Cervix: midposition


  Dilatation: 3-4


  Effacement: 50


  Station: -3


  Presentation: vertex


  Membranes: intact


  Uterine Contractions: occasional


FHT's Twin A:


  Category: I


  Baseline: 135


  Reactive: +


  Variability: moderate


  Decels: none


FHT's Twin B:


  Category: I


  Baseline: 120


  Reactive: +


  Variability: moderate


  Decels: none


EXTREMITIES: No cyanosis or edema, non-tender, without signs of DVT.


 (Enid Butler MD, R3)





Assessment and Plan


Problem List:  


(1) Twin gestation in third trimester


ICD Codes:  O30.003 - Twin pregnancy, unspecified number of placenta and 

unspecified number of amniotic sacs, third trimester


Qualifiers:  


   Qualified Codes:  O30.043 - Twin pregnancy, dichorionic/diamniotic, third 

trimester


(2)  labor in third trimester


ICD Codes:  O60.03 -  labor without delivery, third trimester


Qualifiers:  


   Qualified Codes:  O60.03 -  labor without delivery, third trimester


(3) 32 weeks gestation of pregnancy


ICD Codes:  Z3A.32 - 32 weeks gestation of pregnancy


Assessment and Plan


26 year old  at 32-4/7 weeks gestation





1. IUP- twin gestation, Category I tracing on both twins, reassuring.


2.  Labor 


-Contractions returned overnight, Magnesium Sulfate resumed  at 2137


- US shows Twin A: Cephalic, AGA, EFW 1744g, Twin B: Cephalic, AGA, EFW 

1772g


-s/p Betamethasone 12mg IM Q24H x 2 (-)


-GBS negative


-s/p  x 2, will need a repeat  if labor progresses. Will 

continue to monitor closely.





dw Dr. Kumar


 (Enid Butler MD, R3)





Attestation


Patient seen and examined.  Cervix unchanged overnight- 3-/-3.  Continue 

Magnesium Sulfate.  Reexamine for 


cervical change.  Plan d/w patient.  All questions answered.


 (Anna Kumar MD)











Enid Butler MD, R3 Aug 23, 2017 07:52


Anna Kumar MD Aug 23, 2017 09:25

## 2017-08-23 NOTE — MB
cc:

KERI RAVI MD

****

 

 

DATE OF CONSULTATION:  2017

 

YOB: 1990

 

REQUESTING PHYSICIAN

Dr. Limon

 

REASON FOR CONSULTATION

1. Intrauterine pregnancy at 31 and 2/7 weeks based on established due date on

   [facility]  ultrasound for 10/23/2017.  However, the patient

   said that she has been given a due date at [facility] where she

   received prenatal care for 10/19/2017 placing her today at 32 weeks.  No

   prenatal records or previous ultrasound is available to confirm this

   information.

2. Dichorionic diamniotic twin pregnancy.

3.  labor with cervical changes, last pelvic exam at 3 cm per her

   obstetrician.

4. Polyhydramnios on fetus B.

 

HISTORY OF PRESENT ILLNESS

This is the case of a 26-year-old black female, G9, P5-0-3-5, currently at 32

weeks based on an ultrasound dating from [facility] as the patient stated

for 10/19/2017.  As the patient stated she presented to [facility]

instead of her primary obstetrician's office because she was hurting and did

not want to drive up to [facility].  The patient was treated at the time

with magnesium sulfate for tocolysis, steroids for fetal low maturation

enhancement as per protocol and placed on ampicillin for group-B strep

prophylaxis.  At this time the patient is resting comfortably in labor and

delivery at [facility].  Her doctor requested a consult because the

patient had a pelvic exam that is 3 cm dilated and they wanted to know what to

do next.

 

The patient has also been placed as per the available information on [facility]

as maintenance tocolysis by the OB Hospitalist at [facility].

 

The patient had a complete ultrasound on 2017 at 31 weeks gestation with

an estimated fetal weight of fetus A of 1744 grams (3 pounds 13 ounces) that

placed fetus A at the 48th percentile.  Fetus B estimated weight is 1772 grams

(3 pounds 14 ounces) that placed the fetus at the 51st percentile.  The largest

vertical pocket for fluid for fetus A is 7.8 cm, and the largest vertical

pocket for fetus B is 8.1 cm, compatible with mild polyhydramnios.  Fetus A and

fetus B were presenting cephalic on 2017.

 

PAST MEDICAL HISTORY

The patient denies any history of chronic medical illnesses, hypertension,

diabetes, liver, kidney or thyroid problems.

 

FAMILY HISTORY

Noncontributory.

 

OBSTETRICAL HISTORY

The patient is a poor historian and currently prenatal records are not

available for review.

 

PHYSICAL EXAMINATION

At the time of my visit the patient is resting comfortably in bed, in the

company of the father of the pregnancy.  The fetal heart rate of fetus A and

fetus B in the range of 130-145 beats per minute with good short and long-term

viability.  Occasional contractions noted.

VITAL SIGNS:  Blood pressure 118/65, pulse 76.  The patient has been afebrile.

 

GENERAL:  She is alert and oriented, in no acute distress.

NECK:  No jugular venous distention.

HEART:  Regular rhythm.  No murmur.  No gallop.

LUNGS:  Clear.  No rales or rhonchi.

ABDOMEN:  Gravid, soft, depressible.  No suprapubic tenderness.  No uterine

tenderness.  No epigastric tenderness.  No right upper quadrant tenderness.

PELVIC:  Exam was deferred.

 

ASSESSMENT

I discussed with Mrs. Santana that currently at her gestational age and given

that she has shown evidence of cervical changes, I would advise for the patient

to remain in the hospital for close maternal and fetal surveillance.  Magnesium

sulfate for neuroprotection is not advised after 32 weeks.  Given that this

patient already had completed the steroids of fetal low maturation over 36

hours ago, in the event that the patient were to present with recurrent 

labor may consider magnesium sulfate as a tocolytic, but do not increase the

magnesium sulfate at or above 2 grams an hour.  If evidence of progressive

cervical changes, I will advise the patient to be allowed to progress and

deliver the babies.

 

Also in view of her gestational age advised intrapartum antibiotic prophylaxis

as per protocol for group-B strep prophylaxis.

 

The patient expressed her desire to leave the hospital to go to the hospital

where her primary obstetrician delivers.  I advised that in my opinion the

patient should not be transferred at this point and there is no medical

indication to be transferred out of [facility] which is a level II

institution which should be able to take care of fetuses at 32 weeks at her

current gestational age and fetal weight.

 

The patient expressed that she is considering signing against medical advice.

 

 

RECOMMENDATIONS

1. Continue in-house maternal fetal surveillance in view of twin gestation at

   32 weeks by an estimated date for 10/19/2017 as the patient stated.

2. In the event of spontaneous labor, may consider magnesium sulfate as a

   tocolytic; however, if progressive cervical changes advised to stop the

   magnesium sulfate and allow labor to progress and deliver.

3. In the event that the patient were to have recurrent  labor with

   imminent delivery, initiate group-B strep prophylaxis with antibiotics as

   per protocol.

4. Neonatology consult.

5. In the even that the patient were to rupture membranes on twin A and no

   evidence of contractions or infection present, recommend a course of

   ampicillin with erythromycin and delivery at or after 34 weeks gestation.

6. In view the patient is now ruby currently, recommend to stop

   magnesium sulfate by intravenous administration at present.

7. Mrs. Santana was allowed to ask questions that were answered to her

   satisfaction.

 

Thank you for allowing [facility] to participate in the

care of your patient.

 

 

 

 

 

                              _______________________________

                         Keri TODD

D:  2017/2:16 PM

T:  2017/2:51 PM

Visit #:  [id number]

Job #:  [id number]

[place]

## 2017-08-23 NOTE — PD.OB.ANTE
Subjective


Diagnosis:  


(1) Twin gestation in third trimester


(2)  labor in third trimester


Diagnosis:  Principal





(3) 32 weeks gestation of pregnancy


Interval History


25y/o , twin IUP at 32.4 with h/o prior C/S x2. Patient has decided to 

leave Against Medical Advice. She had spoken about this previously with Dr. West who recommended against leaving. I came to room and had extensive 

conversation with patient at bedside regarding leaving against medical advice. 

Discussed that hospital and physician will not be responsible for any risks. 

Discussed risks that include but are not limited to  deliver, uterine 

rupture, maternal and/or fetal brain damage and/or death. Patient still insists 

she wants to leave and will leave against medical advise, despite the risk of 

adverse maternal and  outcome.





Objective


Vital Signs





Vital Signs








  Date Time  Temp Pulse Resp B/P (MAP) Pulse Ox O2 Delivery O2 Flow Rate FiO2


 


17 12:00  76  110/65 (80)    


 


17 11:52   16     


 


17 11:00  80  106/59 (75)    


 


17 10:00  78  106/58 (74)    


 


17 09:56   16     


 


17 09:00  72  110/66 (81)    


 


17 08:54   18     


 


17 08:00  70  105/66 (79)    


 


17 07:57 97.7       


 


17 07:10   18     


 


17 07:00  86  104/55 (71)    


 


17 06:00  72 18 85/44 (58)    


 


17 05:00  63  94/46 (62)    


 


17 04:52   18     


 


17 04:00  79  98/51 (67)    


 


17 04:00   18     


 


17 03:00   18     


 


17 03:00  75  85/38 (54)    


 


17 02:00   18     


 


17 02:00  77  99/52 (68)    


 


17 01:00   18     


 


17 01:00  73  110/56 (74)    


 


17 00:00 98.0       


 


17 00:00   18     


 


17 00:00  73  109/57 (74)    


 


17 23:03   18     


 


17 23:00  78  103/55 (71)    


 


17 23:00   18     


 


17 22:00  79  109/59 (76)    


 


17 21:35  81  101/60 (74)    


 


17 21:30  77  106/58 (74)    


 


17 21:25  83  106/58 (74)    


 


17 21:20  78  104/58 (73)    


 


17 21:15  76 18 109/58 (75)    


 


17 21:00 98.0       


 


17 21:00   18     








Lab & Micro Results











Test


  17


03:50


 


Magnesium Level 5.1 MG/DL 














 Date/Time


Source Procedure


Growth Status


 


 


 17 08:20


Genital Genital Region Group B Streptococcus Screen - Preliminary


NO GROUP B STREP ISOLATED Resulted








Physical Exam


GENERAL: Well-nourished, well-developed patient.


CARDIOVASCULAR: Regular rate and rhythm without murmurs, gallops, or rubs.


RESPIRATORY: Breath sounds equal bilaterally. No accessory muscle use.


ABDOMEN/GI: Abdomen soft, non-tender.


  Fundus: [-]


GENITOURINARY:


External Genitalia: intact and normal in appearance


  Cervix: [-]


  Dilatation: [-]


  Effacement: [-]


  Station: [-]


  Presentation: [-]


  Membranes: [-]


  Uterine Contractions: [-]


FHT's:


  Category: [-]


  Baseline: [-]


  Reactive: [-]


  Variability: [-]


  Decels: [-]


EXTREMITIES: No cyanosis or edema, non-tender, without signs of DVT.





Assessment and Plan


Problem List:  


(1) Twin gestation in third trimester


ICD Codes:  O30.003 - Twin pregnancy, unspecified number of placenta and 

unspecified number of amniotic sacs, third trimester


Qualifiers:  


   Qualified Codes:  O30.043 - Twin pregnancy, dichorionic/diamniotic, third 

trimester


(2)  labor in third trimester


ICD Codes:  O60.03 -  labor without delivery, third trimester


Qualifiers:  


   Qualified Codes:  O60.03 -  labor without delivery, third trimester


(3) 32 weeks gestation of pregnancy


ICD Codes:  Z3A.32 - 32 weeks gestation of pregnancy


Assessment and Plan


26 year old  at 32-4/7 weeks gestation





1. IUP- twin gestation, Category I tracing on both twins, reassuring.


2.  Labor 


-Contractions returned overnight, Magnesium Sulfate resumed  at 2137


- US shows Twin A: Cephalic, AGA, EFW 1744g, Twin B: Cephalic, AGA, EFW 

1772g


-s/p Betamethasone 12mg IM Q24H x 2 (-)


-GBS negative


-s/p  x 2, will need a repeat  if labor progresses. Will 

continue to monitor closely.





cesia Limon,Deena Delong MD Aug 23, 2017 18:20

## 2017-08-28 LAB
BATH SALTS (MDPV) UR: (no result)
ECSTASY (MDMA) UR: (no result)
GABAPENTIN UR: (no result)
HEROIN (6-ACETYLMORPHINE) UR: (no result)
HYDROMORPHONE U: (no result)
K2 SPICE UR: (no result)
METHADONE UR QL SCN: (no result)
PCP UR-MCNC: (no result) UG/L

## 2017-08-31 ENCOUNTER — HOSPITAL ENCOUNTER (INPATIENT)
Dept: HOSPITAL 17 - HOBED | Age: 27
LOS: 5 days | Discharge: HOME | End: 2017-09-05
Attending: OBSTETRICS & GYNECOLOGY | Admitting: OBSTETRICS & GYNECOLOGY
Payer: MEDICAID

## 2017-08-31 VITALS — HEART RATE: 70 BPM | SYSTOLIC BLOOD PRESSURE: 113 MMHG | RESPIRATION RATE: 18 BRPM | DIASTOLIC BLOOD PRESSURE: 70 MMHG

## 2017-08-31 VITALS
DIASTOLIC BLOOD PRESSURE: 59 MMHG | HEART RATE: 80 BPM | TEMPERATURE: 98.6 F | RESPIRATION RATE: 18 BRPM | SYSTOLIC BLOOD PRESSURE: 94 MMHG

## 2017-08-31 VITALS
TEMPERATURE: 98.5 F | HEART RATE: 72 BPM | DIASTOLIC BLOOD PRESSURE: 63 MMHG | RESPIRATION RATE: 20 BRPM | SYSTOLIC BLOOD PRESSURE: 96 MMHG

## 2017-08-31 VITALS — SYSTOLIC BLOOD PRESSURE: 114 MMHG | HEART RATE: 80 BPM | DIASTOLIC BLOOD PRESSURE: 77 MMHG

## 2017-08-31 VITALS
DIASTOLIC BLOOD PRESSURE: 60 MMHG | TEMPERATURE: 97.5 F | SYSTOLIC BLOOD PRESSURE: 92 MMHG | HEART RATE: 18 BPM | RESPIRATION RATE: 18 BRPM

## 2017-08-31 VITALS
SYSTOLIC BLOOD PRESSURE: 103 MMHG | DIASTOLIC BLOOD PRESSURE: 70 MMHG | RESPIRATION RATE: 18 BRPM | HEART RATE: 64 BPM | TEMPERATURE: 97.7 F

## 2017-08-31 VITALS
OXYGEN SATURATION: 94 % | DIASTOLIC BLOOD PRESSURE: 67 MMHG | HEART RATE: 70 BPM | SYSTOLIC BLOOD PRESSURE: 110 MMHG | RESPIRATION RATE: 18 BRPM

## 2017-08-31 VITALS — RESPIRATION RATE: 18 BRPM | DIASTOLIC BLOOD PRESSURE: 75 MMHG | SYSTOLIC BLOOD PRESSURE: 113 MMHG | HEART RATE: 81 BPM

## 2017-08-31 VITALS — TEMPERATURE: 97.9 F

## 2017-08-31 VITALS — RESPIRATION RATE: 18 BRPM

## 2017-08-31 VITALS
RESPIRATION RATE: 18 BRPM | HEART RATE: 60 BPM | SYSTOLIC BLOOD PRESSURE: 120 MMHG | OXYGEN SATURATION: 96 % | TEMPERATURE: 97.7 F | DIASTOLIC BLOOD PRESSURE: 64 MMHG

## 2017-08-31 DIAGNOSIS — O30.043: ICD-10-CM

## 2017-08-31 DIAGNOSIS — O34.211: ICD-10-CM

## 2017-08-31 DIAGNOSIS — Z30.2: ICD-10-CM

## 2017-08-31 DIAGNOSIS — R10.9: ICD-10-CM

## 2017-08-31 DIAGNOSIS — Z3A.33: ICD-10-CM

## 2017-08-31 DIAGNOSIS — I95.9: ICD-10-CM

## 2017-08-31 LAB
BASOPHILS # BLD AUTO: 0 TH/MM3 (ref 0–0.2)
BASOPHILS NFR BLD: 0.4 % (ref 0–2)
COLOR UR: (no result)
COMMENT (UR): (no result)
CULTURE IF INDICATED: (no result)
EOSINOPHIL # BLD: 0.5 TH/MM3 (ref 0–0.4)
EOSINOPHIL NFR BLD: 5.7 % (ref 0–4)
ERYTHROCYTE [DISTWIDTH] IN BLOOD BY AUTOMATED COUNT: 14.6 % (ref 11.6–17.2)
GLUCOSE UR STRIP-MCNC: (no result) MG/DL
HCT VFR BLD CALC: 33.1 % (ref 35–46)
HEMO FLAGS: (no result)
HGB UR QL STRIP: (no result)
KETONES UR STRIP-MCNC: 10 MG/DL
LYMPHOCYTES # BLD AUTO: 1.8 TH/MM3 (ref 1–4.8)
LYMPHOCYTES NFR BLD AUTO: 19.5 % (ref 9–44)
MCH RBC QN AUTO: 29.2 PG (ref 27–34)
MCHC RBC AUTO-ENTMCNC: 33.2 % (ref 32–36)
MCV RBC AUTO: 88 FL (ref 80–100)
MONOCYTES NFR BLD: 11.6 % (ref 0–8)
MUCOUS THREADS #/AREA URNS LPF: (no result) /LPF
NEUTROPHILS # BLD AUTO: 5.7 TH/MM3 (ref 1.8–7.7)
NEUTROPHILS NFR BLD AUTO: 62.8 % (ref 16–70)
NITRITE UR QL STRIP: (no result)
PLATELET # BLD: 138 TH/MM3 (ref 150–450)
RBC # BLD AUTO: 3.76 MIL/MM3 (ref 4–5.3)
SP GR UR STRIP: 1.01 (ref 1–1.03)
SQUAMOUS #/AREA URNS HPF: 13 /HPF (ref 0–5)
WBC # BLD AUTO: 9.2 TH/MM3 (ref 4–11)

## 2017-08-31 PROCEDURE — 86900 BLOOD TYPING SEROLOGIC ABO: CPT

## 2017-08-31 PROCEDURE — G0481 DRUG TEST DEF 8-14 CLASSES: HCPCS

## 2017-08-31 PROCEDURE — 0UB70ZZ EXCISION OF BILATERAL FALLOPIAN TUBES, OPEN APPROACH: ICD-10-PCS | Performed by: OBSTETRICS & GYNECOLOGY

## 2017-08-31 PROCEDURE — 87040 BLOOD CULTURE FOR BACTERIA: CPT

## 2017-08-31 PROCEDURE — 85014 HEMATOCRIT: CPT

## 2017-08-31 PROCEDURE — 85007 BL SMEAR W/DIFF WBC COUNT: CPT

## 2017-08-31 PROCEDURE — 83605 ASSAY OF LACTIC ACID: CPT

## 2017-08-31 PROCEDURE — 81001 URINALYSIS AUTO W/SCOPE: CPT

## 2017-08-31 PROCEDURE — 86850 RBC ANTIBODY SCREEN: CPT

## 2017-08-31 PROCEDURE — 80053 COMPREHEN METABOLIC PANEL: CPT

## 2017-08-31 PROCEDURE — 74177 CT ABD & PELVIS W/CONTRAST: CPT

## 2017-08-31 PROCEDURE — 85025 COMPLETE CBC W/AUTO DIFF WBC: CPT

## 2017-08-31 PROCEDURE — 86901 BLOOD TYPING SEROLOGIC RH(D): CPT

## 2017-08-31 PROCEDURE — 88307 TISSUE EXAM BY PATHOLOGIST: CPT

## 2017-08-31 PROCEDURE — 85027 COMPLETE CBC AUTOMATED: CPT

## 2017-08-31 PROCEDURE — 85018 HEMOGLOBIN: CPT

## 2017-08-31 PROCEDURE — 59025 FETAL NON-STRESS TEST: CPT

## 2017-08-31 PROCEDURE — 88302 TISSUE EXAM BY PATHOLOGIST: CPT

## 2017-08-31 PROCEDURE — 80307 DRUG TEST PRSMV CHEM ANLYZR: CPT

## 2017-08-31 RX ADMIN — STANDARDIZED SENNA CONCENTRATE AND DOCUSATE SODIUM PRN TAB: 8.6; 5 TABLET, FILM COATED ORAL at 20:03

## 2017-08-31 RX ADMIN — OXYTOCIN SCH MLS/HR: 10 INJECTION, SOLUTION INTRAMUSCULAR; INTRAVENOUS at 07:51

## 2017-08-31 RX ADMIN — OXYCODONE HYDROCHLORIDE AND ACETAMINOPHEN PRN TAB: 5; 325 TABLET ORAL at 21:12

## 2017-08-31 RX ADMIN — DIPHENHYDRAMINE HYDROCHLORIDE PRN MG: 50 CAPSULE ORAL at 20:04

## 2017-08-31 RX ADMIN — IBUPROFEN PRN MG: 600 TABLET, FILM COATED ORAL at 21:12

## 2017-08-31 RX ADMIN — OXYCODONE HYDROCHLORIDE AND ACETAMINOPHEN PRN TAB: 5; 325 TABLET ORAL at 17:01

## 2017-08-31 RX ADMIN — OXYTOCIN SCH MLS/HR: 10 INJECTION, SOLUTION INTRAMUSCULAR; INTRAVENOUS at 20:49

## 2017-08-31 RX ADMIN — Medication SCH ML: at 21:13

## 2017-08-31 NOTE — HHI.HP
History & Physical


H&P


HPI


Chief Complaint


Contractions


Date Seen:  Aug 31, 2017


Travel History


International Travel<30 Days:  No


Contact w/Intl Traveler<30Days:  No





History of Present Illness


HPI


Patient is a 26-year-old  at 33-5/7 weeks gestation who presents today for 

contractions.  She woke up with contractions around 3 AM and they have 

progressively increased in frequency and intensity.  She denies any vaginal 

bleeding or discharge.  No gush or leaking of fluid.  Positive fetal movement.





 History (Limited) 


History


Past Medical History


Medical History:  Denies Significant Hx





Obstetric History


Obstetric History


First baby born 2009 female infant weight 6 lbs. 10 oz. vaginal 

delivery at term


Second baby born 2011 male infant weight 6 lbs. 4 oz. vaginal delivery 

at term


Third baby born 2012 female infant 5 lbs. 4 oz. born at 38 weeks


 #4 baby 2014 male infant weight approximately 5 pounds delivered by 

primary  secondary to nonreassuring fetal heart tracing


Fifth baby born 2016 female infant weight 6 lbs. 2 oz. repeat 





This twin gestation patient is scheduled for repeat  





Past Surgical History


*** Narrative Surgical


 2





Family History


*** Narrative Family History


Father with hypertension





Social History


Alcohol Use:  No


Tobacco Use:  No


Substance Abuse:  No





 Allergies-Medications 


Allergies-Medications


(Allergen,Severity, Reaction):  


Coded Allergies:  


     No Known Allergies (Unverified , 17)


Home Meds


Active Scripts


Prednisone (Deltasone) 20 Mg Tab, 20 MG PO BID, #10 TAB


   Prov:Gracia Webb MD         17





 ROS 


Review of Systems


Except as stated in HPI:  all other systems reviewed are Neg


General / Constitutional:  No: Fever, Chills


Eyes:  No: Blurred Vision, Visual changes


HENT:  No: Headaches


Cardiovascular:  No: Chest Pain or Discomfort, Palpitations


Respiratory:  No: Cough, Short of Breath


Gastrointestinal:  No: Abdominal Pain


Genitourinary:  Pelvic Pain, No: Dysuria, Hematuria, Discharge, Vaginal Bleeding


Musculoskeletal:  No: Edema


Psychiatric:  No: Substance Abuse





 Physical Exam 


Physical Exam


Narrative


GENERAL: Well-nourished, well-developed patient.


SKIN: Warm and dry.


HEAD: Normocephalic and atraumatic.


EYES: No scleral icterus. No injection or drainage. 


ENT: No nasal drainage noted. Mucous membranes pink. Airway patent.


NECK: Supple, trachea midline. No JVD.


CARDIOVASCULAR: Regular rate and rhythm without murmurs, gallops, or rubs. 


RESPIRATORY: Breath sounds equal bilaterally. No accessory muscle use.


ABDOMEN/GI: Abdomen soft, non-tender, bowel sounds present, no rebound, no 

guarding 


   Gravid to 33 weeks size


GENITOURINARY: 


   External Genitalia: intact and normal in appearance


   BUS glands: normal


   Cervix: midposition


   Dilatation: 4          


   Effacement: 70          


   Station: -2  


   Presentation: vertex-vertex        


   Membranes: intact


   Uterine Contractions: q2-4min


FHT's


Twin A: 


   Category: I  


   Baseline: 140   


   Reactive: +   


   Variability: moderate  


   Decels: none  


Twin B:


Category: I   


   Baseline: 140   


   Reactive: +   


   Variability: moderate  


   Decels: none  


EXTREMITIES: No cyanosis or edema.


BACK: Nontender without obvious deformity. No CVA tenderness.


NEUROLOGICAL: Awake and alert. Motor and sensory grossly within normal limits. 

Normal speech.





 Data 


Vital Signs Reviewed:  Yes


Orders





 Orders


Vital Signs (Adult) .ON ADMISSION (17 06:47)


^ Labor Status (17 06:47)


^ Non Stress Test (17 06:47)


Group B Strep:  Negative





 MDM 


MDM


Medical Record Reviewed:  Yes


Narrative Course / MDM


26 year old  at 33-5/7 weeks gestation.





1. IUP- twin gestation, Category I tracing for both


2.  Labor- Regular painful contractions, s/p  x 2, will need 

repeat . Admit for labor, plan for .


 US shows Dichorionic/Diamniotic twins. Twin A: Cephalic, AGA, EFW 1744g, 

Twin B: Cephalic, AGA, EFW 1772g


s/p Betamethasone 12mg IM Q24H x 2 (-)


3. GBS negative


4. Dispo:  with bilateral tubal ligation.





sdw Dr. Declan Butler,Enid WALTER MD, R3 Aug 31, 2017 06:59

## 2017-08-31 NOTE — PD
HPI


Chief Complaint


Contractions


Date Seen:  Aug 31, 2017


Travel History


International Travel<30 Days:  No


Contact w/Intl Traveler<30Days:  No





History of Present Illness


HPI


Patient is a 26-year-old  at 33-5/7 weeks gestation who presents today for 

contractions.  She woke up with contractions around 3 AM and they have 

progressively increased in frequency and intensity.  She denies any vaginal 

bleeding or discharge.  No gush or leaking of fluid.  Positive fetal movement.





History


Past Medical History


Medical History:  Denies Significant Hx





Obstetric History


Obstetric History


First baby born 2009 female infant weight 6 lbs. 10 oz. vaginal 

delivery at term


Second baby born 2011 male infant weight 6 lbs. 4 oz. vaginal delivery 

at term


Third baby born 2012 female infant 5 lbs. 4 oz. born at 38 weeks


 #4 baby 2014 male infant weight approximately 5 pounds delivered by 

primary  secondary to nonreassuring fetal heart tracing


Fifth baby born 2016 female infant weight 6 lbs. 2 oz. repeat 





This twin gestation patient is scheduled for repeat  





Past Surgical History


*** Narrative Surgical


 2





Family History


*** Narrative Family History


Father with hypertension





Social History


Alcohol Use:  No


Tobacco Use:  No


Substance Abuse:  No





Allergies-Medications


(Allergen,Severity, Reaction):  


Coded Allergies:  


     No Known Allergies (Unverified , 17)


Home Meds


Active Scripts


Prednisone (Deltasone) 20 Mg Tab, 20 MG PO BID, #10 TAB


   Prov:Gracia Webb MD         17





Review of Systems


Except as stated in HPI:  all other systems reviewed are Neg


General / Constitutional:  No: Fever, Chills


Eyes:  No: Blurred Vision, Visual changes


HENT:  No: Headaches


Cardiovascular:  No: Chest Pain or Discomfort, Palpitations


Respiratory:  No: Cough, Short of Breath


Gastrointestinal:  No: Abdominal Pain


Genitourinary:  Pelvic Pain, No: Dysuria, Hematuria, Discharge, Vaginal Bleeding


Musculoskeletal:  No: Edema


Psychiatric:  No: Substance Abuse





Physical Exam


Narrative


GENERAL: Well-nourished, well-developed patient.


SKIN: Warm and dry.


HEAD: Normocephalic and atraumatic.


EYES: No scleral icterus. No injection or drainage. 


ENT: No nasal drainage noted. Mucous membranes pink. Airway patent.


NECK: Supple, trachea midline. No JVD.


CARDIOVASCULAR: Regular rate and rhythm without murmurs, gallops, or rubs. 


RESPIRATORY: Breath sounds equal bilaterally. No accessory muscle use.


ABDOMEN/GI: Abdomen soft, non-tender, bowel sounds present, no rebound, no 

guarding 


   Gravid to 33 weeks size


GENITOURINARY: 


   External Genitalia: intact and normal in appearance


   BUS glands: normal


   Cervix: midposition


   Dilatation: 4          


   Effacement: 70          


   Station: -2  


   Presentation: vertex-vertex        


   Membranes: intact


   Uterine Contractions: q2-4min


FHT's


Twin A: 


   Category: I  


   Baseline: 140   


   Reactive: +   


   Variability: moderate  


   Decels: none  


Twin B:


Category: I   


   Baseline: 140   


   Reactive: +   


   Variability: moderate  


   Decels: none  


EXTREMITIES: No cyanosis or edema.


BACK: Nontender without obvious deformity. No CVA tenderness.


NEUROLOGICAL: Awake and alert. Motor and sensory grossly within normal limits. 

Normal speech.





Data


Data


Vital Signs Reviewed:  Yes


Orders





 Orders


Vital Signs (Adult) .ON ADMISSION (17 06:47)


^ Labor Status (17 06:47)


^ Non Stress Test (17 06:47)


Group B Strep:  Negative





MDM


Medical Record Reviewed:  Yes


Narrative Course / MDM


26 year old  at 33-5/7 weeks gestation.





1. IUP- twin gestation, Category I tracing for both


2.  Labor- Regular painful contractions, s/p  x 2, will need 

repeat . Admit for labor, plan for .


 US shows Dichorionic/Diamniotic twins. Twin A: Cephalic, AGA, EFW 1744g, 

Twin B: Cephalic, AGA, EFW 1772g


s/p Betamethasone 12mg IM Q24H x 2 (-)


3. GBS negative





sdw Enid Leal MD, R3 Aug 31, 2017 06:50

## 2017-09-01 VITALS
SYSTOLIC BLOOD PRESSURE: 96 MMHG | TEMPERATURE: 97.9 F | HEART RATE: 84 BPM | DIASTOLIC BLOOD PRESSURE: 60 MMHG | RESPIRATION RATE: 16 BRPM

## 2017-09-01 VITALS
HEART RATE: 71 BPM | DIASTOLIC BLOOD PRESSURE: 78 MMHG | SYSTOLIC BLOOD PRESSURE: 106 MMHG | RESPIRATION RATE: 18 BRPM | TEMPERATURE: 98.2 F

## 2017-09-01 VITALS
HEART RATE: 82 BPM | TEMPERATURE: 98.8 F | SYSTOLIC BLOOD PRESSURE: 102 MMHG | RESPIRATION RATE: 16 BRPM | DIASTOLIC BLOOD PRESSURE: 60 MMHG

## 2017-09-01 VITALS — RESPIRATION RATE: 16 BRPM

## 2017-09-01 VITALS
TEMPERATURE: 99.9 F | RESPIRATION RATE: 18 BRPM | SYSTOLIC BLOOD PRESSURE: 91 MMHG | HEART RATE: 83 BPM | DIASTOLIC BLOOD PRESSURE: 59 MMHG

## 2017-09-01 LAB
BASOPHILS # BLD AUTO: 0 TH/MM3 (ref 0–0.2)
BASOPHILS NFR BLD: 0.4 % (ref 0–2)
EOSINOPHIL # BLD: 0.4 TH/MM3 (ref 0–0.4)
EOSINOPHIL NFR BLD: 3.2 % (ref 0–4)
ERYTHROCYTE [DISTWIDTH] IN BLOOD BY AUTOMATED COUNT: 14.4 % (ref 11.6–17.2)
HCT VFR BLD CALC: 27.6 % (ref 35–46)
HEMO FLAGS: (no result)
LYMPHOCYTES # BLD AUTO: 1 TH/MM3 (ref 1–4.8)
LYMPHOCYTES NFR BLD AUTO: 9.1 % (ref 9–44)
MCH RBC QN AUTO: 28.6 PG (ref 27–34)
MCHC RBC AUTO-ENTMCNC: 32.7 % (ref 32–36)
MCV RBC AUTO: 87.5 FL (ref 80–100)
MONOCYTES NFR BLD: 11.8 % (ref 0–8)
NEUTROPHILS # BLD AUTO: 8.3 TH/MM3 (ref 1.8–7.7)
NEUTROPHILS NFR BLD AUTO: 75.5 % (ref 16–70)
PLATELET # BLD: 106 TH/MM3 (ref 150–450)
RBC # BLD AUTO: 3.15 MIL/MM3 (ref 4–5.3)
WBC # BLD AUTO: 11 TH/MM3 (ref 4–11)

## 2017-09-01 RX ADMIN — OXYCODONE HYDROCHLORIDE AND ACETAMINOPHEN PRN TAB: 5; 325 TABLET ORAL at 04:47

## 2017-09-01 RX ADMIN — OXYCODONE HYDROCHLORIDE AND ACETAMINOPHEN PRN TAB: 5; 325 TABLET ORAL at 14:36

## 2017-09-01 RX ADMIN — DIPHENHYDRAMINE HYDROCHLORIDE PRN MG: 50 CAPSULE ORAL at 04:47

## 2017-09-01 RX ADMIN — STANDARDIZED SENNA CONCENTRATE AND DOCUSATE SODIUM PRN TAB: 8.6; 5 TABLET, FILM COATED ORAL at 10:41

## 2017-09-01 RX ADMIN — IBUPROFEN PRN MG: 600 TABLET, FILM COATED ORAL at 17:14

## 2017-09-01 RX ADMIN — IBUPROFEN PRN MG: 600 TABLET, FILM COATED ORAL at 10:39

## 2017-09-01 RX ADMIN — OXYCODONE HYDROCHLORIDE AND ACETAMINOPHEN PRN TAB: 5; 325 TABLET ORAL at 10:39

## 2017-09-01 RX ADMIN — DIPHENHYDRAMINE HYDROCHLORIDE PRN MG: 50 CAPSULE ORAL at 10:40

## 2017-09-01 RX ADMIN — OXYTOCIN SCH MLS/HR: 10 INJECTION, SOLUTION INTRAMUSCULAR; INTRAVENOUS at 03:29

## 2017-09-01 RX ADMIN — IBUPROFEN PRN MG: 600 TABLET, FILM COATED ORAL at 04:47

## 2017-09-01 RX ADMIN — OXYCODONE HYDROCHLORIDE AND ACETAMINOPHEN PRN TAB: 5; 325 TABLET ORAL at 22:09

## 2017-09-01 NOTE — HHI.OB
Subjective


Remarks


POD day # 1 No acute issues overnight, vitals are stable, patient remains 

afebrile. Incision not draining. Decreasing lochia and pain. Patient is 

ambulating without difficulty and voiding independently.  She is feeding the 

baby via breast/formula.  She denies any nausea or vomiting and has a good 

appetite. Positive flatus/bowel movement.  She denies any calf pain, chest pain

, or shortness of breath.  She is bonding well with infant.





Objective


Vitals/I&O





Vital Signs








  Date Time  Temp Pulse Resp B/P (MAP) Pulse Ox O2 Delivery O2 Flow Rate FiO2


 


17 04:47    91/59 (70)    


 


17 04:47 99.9 83 18     


 


17 04:47 98.9       


 


17 03:33   16     


 


17 02:20   16     


 


17 01:00   16     


 


17 23:45 98.6 80 18 94/59 (71)    


 


17 19:50 98.5       


 


17 19:50  72 20 96/63 (74)    


 


17 15:05 97.7 64 18 103/70 (81)    


 


17 12:05  67      


 


17 12:05 97.5 18 18 92/60 (71)    


 


17 10:55  81 18 113/75 (88)    


 


17 10:50 97.9       


 


17 10:40    113/70 (84)    


 


17 10:40  70 18     


 


17 10:25     94   


 


17 10:25  70 18 110/67 (81)    


 


17 10:06  60 18 120/64 (82) 96   


 


17 10:06 97.7       


 


17 07:48 97.9       








Result Diagram:  


17 0456





Objective Remarks


GENERAL: Well-nourished, well-developed patient.


CARDIOVASCULAR: Regular rate and rhythm without murmurs, gallops, or rubs. 


RESPIRATORY: Breath sounds equal bilaterally. No accessory muscle use.


ABDOMEN/GI: Abdomen soft, non-tender, bowel sounds present. 


   Incision: Clean, dry and intact.


   Fundus: Firm, non-tender at umbilicus.


GENITOURINARY: Light to moderate bleeding.


EXTREMITIES: No cyanosis or edema, non-tender, without signs of DVT.


Medications and IVs





Current Medications








 Medications


  (Trade)  Dose


 Ordered  Sig/Geoffrey


 Route  Start Time


 Stop Time Status Last Admin


 


 Lactated Ringer's  1,000 ml @ 


 150 mls/hr  Q6H40M


 IV  17 07:29


    17 07:51


 


 


 Lactated Ringer's  1,000 ml @ 


 100 mls/hr  Q10H


 IV  17 14:49


 17 10:48   


 


 


 Oxytocin  500 ml @ 


 100 mls/hr  UNSCH X1  PRN


 IV  17 20:00


 17 19:59   


 


 


  (NS Flush)  2 ml  BID


 IV FLUSH  17 21:00


    17 21:13


 


 


  (NS Flush)  2 ml  UNSCH  PRN


 IV FLUSH  17 10:00


     


 


 


  (Mylicon Chew)  80 mg  QID  PRN


 PO  17 10:00


     


 


 


  (Tylenol)  650 mg  Q6H  PRN


 PO  17 10:00


     


 


 


  (Motrin)  600 mg  Q6H  PRN


 PO  17 10:00


    17 04:47


 


 


  (Percocet  5-325


 Mg)  1 tab  Q4H  PRN


 PO  17 10:00


     


 


 


  (Percocet  5-325


 Mg)  2 tab  Q4H  PRN


 PO  17 10:00


    17 04:47


 


 


  (Elena-Colace)  2 tab  Q12H  PRN


 PO  17 10:00


    17 20:03


 


 


  (Ambien)  5 mg  HS  PRN


 PO  17 10:00


     


 


 


  (M-M-R Ii Inj)  0.5 ml  ONCE ONCE


 SQ  17 16:00


 17 16:01   


 


 


  (Zofran Inj)  4 mg  Q6H  PRN


 IV PUSH  17 10:00


     


 


 


  (Benadryl Inj)  25 mg  Q4H  PRN


 IV PUSH  17 14:30


     


 


 


  (Benadryl)  25 mg  Q4H  PRN


 PO  17 14:30


    17 14:51


 


 


 Miscellaneous


 Information  NO SYSTEMIC


 NARCOTICS TO BE


 GIVEN FO...  UNSCH  PRN


 .XX  17 15:00


 17 14:59   


 


 


  (Narcan Inj)  0.4 mg  UNSCH  PRN


 IV  17 15:00


 17 14:59   


 


 


  (Benadryl Inj)  25 mg  Q6H  PRN


 IV PUSH  17 15:00


 17 14:59   


 


 


  (Benadryl)  50 mg  Q6H  PRN


 PO  17 15:00


 17 14:59  17 04:47


 


 


 Miscellaneous


 Information  ALL


 NURSING


 DEPARTMENTS  UNSCH  PRN


 .XX  17 15:00


 17 14:59   


 











Assessment/Plan


Problem List:  


(1) Twin birth delivered by  section in hospital


ICD Codes:  Z38.31 - Twin liveborn infant, delivered by 


(2)  delivery delivered


ICD Codes:  O82 - Encounter for  delivery without indication


Assessment and Plan


27 y/o female who is POD # 1 s/p  for  labor at 33-5/7 weeks 

gestation. 


-Continue routine postpartum care.


- Post-op Hgb stable at 9.0.


-Percocet and Motrin PRN pain.


-Encouraged OOB. Advised pelvic rest for 6 wks.  Will need a f/u appt. in 1 wk 

for incision check. 


-Re: birth ctrl, she is s/p bilateral tubal ligation.


- Discharge home in 1-2 days.    





dw Dr. Limon and Dr. Magallon R1











Enid Butler MD, R3 Sep 1, 2017 07:20

## 2017-09-02 VITALS
TEMPERATURE: 100 F | SYSTOLIC BLOOD PRESSURE: 97 MMHG | DIASTOLIC BLOOD PRESSURE: 52 MMHG | OXYGEN SATURATION: 97 % | HEART RATE: 142 BPM | RESPIRATION RATE: 24 BRPM

## 2017-09-02 VITALS
SYSTOLIC BLOOD PRESSURE: 80 MMHG | TEMPERATURE: 99.9 F | OXYGEN SATURATION: 98 % | RESPIRATION RATE: 16 BRPM | DIASTOLIC BLOOD PRESSURE: 35 MMHG | HEART RATE: 126 BPM

## 2017-09-02 VITALS
HEART RATE: 119 BPM | DIASTOLIC BLOOD PRESSURE: 36 MMHG | TEMPERATURE: 99.9 F | RESPIRATION RATE: 20 BRPM | SYSTOLIC BLOOD PRESSURE: 78 MMHG

## 2017-09-02 VITALS
SYSTOLIC BLOOD PRESSURE: 70 MMHG | TEMPERATURE: 99.4 F | DIASTOLIC BLOOD PRESSURE: 40 MMHG | RESPIRATION RATE: 24 BRPM | HEART RATE: 121 BPM

## 2017-09-02 VITALS
SYSTOLIC BLOOD PRESSURE: 120 MMHG | RESPIRATION RATE: 18 BRPM | DIASTOLIC BLOOD PRESSURE: 71 MMHG | HEART RATE: 121 BPM | TEMPERATURE: 98.7 F

## 2017-09-02 VITALS — RESPIRATION RATE: 20 BRPM

## 2017-09-02 VITALS
TEMPERATURE: 99.4 F | SYSTOLIC BLOOD PRESSURE: 70 MMHG | RESPIRATION RATE: 24 BRPM | HEART RATE: 121 BPM | DIASTOLIC BLOOD PRESSURE: 40 MMHG

## 2017-09-02 VITALS — SYSTOLIC BLOOD PRESSURE: 70 MMHG | RESPIRATION RATE: 24 BRPM | TEMPERATURE: 99.9 F | DIASTOLIC BLOOD PRESSURE: 30 MMHG

## 2017-09-02 LAB
BASOPHILS # BLD AUTO: 0 TH/MM3 (ref 0–0.2)
BASOPHILS NFR BLD: 0.2 % (ref 0–2)
EOSINOPHIL # BLD: 0.1 TH/MM3 (ref 0–0.4)
EOSINOPHIL NFR BLD: 1.8 % (ref 0–4)
ERYTHROCYTE [DISTWIDTH] IN BLOOD BY AUTOMATED COUNT: 14.7 % (ref 11.6–17.2)
HCT VFR BLD CALC: 26.9 % (ref 35–46)
HCT VFR BLD CALC: 28.2 % (ref 35–46)
HEMO FLAGS: (no result)
LYMPHOCYTES # BLD AUTO: 0.1 TH/MM3 (ref 1–4.8)
LYMPHOCYTES NFR BLD AUTO: 1.9 % (ref 9–44)
MCH RBC QN AUTO: 29.1 PG (ref 27–34)
MCHC RBC AUTO-ENTMCNC: 32.9 % (ref 32–36)
MCV RBC AUTO: 88.5 FL (ref 80–100)
MONOCYTES NFR BLD: 1.1 % (ref 0–8)
NEUTROPHILS # BLD AUTO: 6.1 TH/MM3 (ref 1.8–7.7)
NEUTROPHILS NFR BLD AUTO: 95 % (ref 16–70)
PLATELET # BLD: 119 TH/MM3 (ref 150–450)
RBC # BLD AUTO: 3.19 MIL/MM3 (ref 4–5.3)
REVIEW FLAG: (no result)
WBC # BLD AUTO: 6.4 TH/MM3 (ref 4–11)

## 2017-09-02 RX ADMIN — SIMETHICONE CHEW TAB 80 MG PRN MG: 80 TABLET ORAL at 06:29

## 2017-09-02 RX ADMIN — STANDARDIZED SENNA CONCENTRATE AND DOCUSATE SODIUM PRN TAB: 8.6; 5 TABLET, FILM COATED ORAL at 01:50

## 2017-09-02 RX ADMIN — SIMETHICONE CHEW TAB 80 MG PRN MG: 80 TABLET ORAL at 15:28

## 2017-09-02 RX ADMIN — OXYCODONE HYDROCHLORIDE AND ACETAMINOPHEN PRN TAB: 5; 325 TABLET ORAL at 01:42

## 2017-09-02 RX ADMIN — OXYCODONE HYDROCHLORIDE AND ACETAMINOPHEN PRN TAB: 5; 325 TABLET ORAL at 10:31

## 2017-09-02 RX ADMIN — SIMETHICONE CHEW TAB 80 MG PRN MG: 80 TABLET ORAL at 01:51

## 2017-09-02 RX ADMIN — STANDARDIZED SENNA CONCENTRATE AND DOCUSATE SODIUM PRN TAB: 8.6; 5 TABLET, FILM COATED ORAL at 15:29

## 2017-09-02 RX ADMIN — CLINDAMYCIN PHOSPHATE SCH MLS/HR: 150 INJECTION, SOLUTION INTRAMUSCULAR; INTRAVENOUS at 18:15

## 2017-09-02 RX ADMIN — OXYCODONE HYDROCHLORIDE AND ACETAMINOPHEN PRN TAB: 5; 325 TABLET ORAL at 06:13

## 2017-09-02 RX ADMIN — CLONIDINE HYDROCHLORIDE PRN MG: 0.1 INJECTION, SOLUTION EPIDURAL at 20:08

## 2017-09-02 RX ADMIN — IBUPROFEN PRN MG: 600 TABLET, FILM COATED ORAL at 01:42

## 2017-09-02 RX ADMIN — ACETAMINOPHEN SCH MG: 10 INJECTION, SOLUTION INTRAVENOUS at 22:33

## 2017-09-02 NOTE — MP
cc:

BRISA CHAPIN MD

****

 

 

DATE OF SURGERY

17

 

PREOPERATIVE DIAGNOSIS

Intrauterine pregnancy with twins at 33 weeks,  labor.

 

POSTOPERATIVE DIAGNOSIS:

1. Intrauterine pregnancy with twins at 33 weeks,  labor.

2. Tubal ligation, desires permanent sterilization.

 

PROCEDURE

Repeat low-transverse  section, bilateral tubal ligation.

 

SURGEON

MD Declan

 

ASSISTANT

Dr. Spears, [facility]

 

ANESTHESIA

Spinal.

 

PREOPERATIVE NOTE

The patient is a 26-year-old black female, G9, P5, 33 weeks with twin gestation

and  labor. She presents dilated 4 to 5 cm, 70% effaced with hard

contractions noted. The patient for repeat  caesarean section, tubal ligation.

Baby A is vertex, baby B vertex as well.

 

PROCEDURE IN DETAIL

The patient was taken to the operating room, placed in supine position on the

operating table. Adequate spinal anesthesia was administered.  She was prepped

and draped for abdominal surgery. Previous Pfannenstiel incision was excised

out and cast away. The incision carried to the fascia __ to the rectus. The

fascia dissected off the rectus muscle and split in the midline. The peritoneal

cavity was entered and the incision was stretched open and extended superiorly

and inferiorly. There was already a window in the lower uterine segment on the

right side and that was extended over to the left side, forming the hysterotomy

as transverse line and then the membranes on baby A was ruptured and clear

fluid. Baby A delivered at 8:43 a.m., female, 2000 grams, Apgar 8, 9. There

were no complications. Cord blood obtained. Baby B then delivered as a vertex

as well, female delivered at 8:47 a.m., baby's weight 1865 grams, Apgar 8, 9.

There were no complications of the delivery. Cord blood obtained. Placenta

manually extracted and sent to pathology. The uterus exteriorized and the

hysterotomy was closed with a running layer of 0 chromic followed by

imbricating suture of same. Hemostasis was achieved with several stick ties on

the right edge of the incision. The uterus was elevated and the patient's tubes

were tied. The left tube was elevated. Hemostat passed to the mesosalpinx and

two sutures brought through that window and tied in two fore and aft and the

intervening segment removed and sent to pathology. Same done on the opposite

side without difficulty. The uterus was elevated. Blood suctioned from

cul-de-sac and gutters and the uterus replaced in the peritoneal cavity. The

rectus muscle was reapproximated with stick ties of 2-0 vicryl. The fascia

closed in a running layer of 0 Vicryl.  Subcutaneous tissue was reapproximated

with 3-0 plain suture and the skin closed subcuticular stitch with 3-0 Monocryl

on a Edmond needle. Hemostasis achieved. Pressure dressing applied.  Estimated

blood loss was 500 cc.  There were no complications. Sponge and needle correct

times two.  The patient went to recovery in stable condition.

 

 

 

 

 

 

                              _________________________________

                              MD MATTHIEU Bell/SKYLER

D:  2017/9:54 AM

T:  2017/9:58 PM

Visit #:  [id number]

Job #:  [id number]

## 2017-09-02 NOTE — HHI.OB
Subjective


Post Operative Day:  2


Remarks


Patient is a 26-year-old  delivered at 33 weeks and 5 days. Patient is 

postop day 2 after  for  labor with twins. Patient reports 10 

out of 10 diffuse abdominal pain that is not helped by pain medications. 

Patient reports eating and drinking without any nausea or vomiting. Patient 

reports minimal bleeding. Patient has passed gas but no bowel movements. Patient

's walking is limited by abdominal pain, but she denies any lower extremity 

pain or shortness of breath. Patient is status post tubal ligation and reports 

desire for breast-feeding.


 (Landau,Michael A MD R2)


Remarks


Patient seen and evaluated with resident under direct supervision, agree with 

assessment and plan.


 (Gabriel Foley MD)





Objective


Vitals/I&O





Vital Signs








  Date Time  Temp Pulse Resp B/P (MAP) Pulse Ox O2 Delivery O2 Flow Rate FiO2


 


17 08:00 98.7 121 18 120/71 (87)    


 


17 19:23    96/60 (72)    


 


17 19:23 97.9 84 16     


 


17 15:00 98.2 71 18     


 


17 15:00    106/78 (87)    








 (Landau,Michael A MD R2)


Result Diagram:  


17 1023





Objective Remarks


VITALS: pulse 121


GENERAL: Well-nourished, well-developed patient.


CARDIOVASCULAR: Regular rate and rhythm without murmurs, gallops, or rubs. 


RESPIRATORY: Breath sounds equal bilaterally. No accessory muscle use.


ABDOMEN/GI: Abdomen soft, diffusely tender, bowel sounds present. No rebound 

tenderness.


   Incision: Clean, dry and intact.


   Fundus: Firm, non-tender at umbilicus.


GENITOURINARY: Light to moderate bleeding.


EXTREMITIES: No cyanosis or edema, non-tender, without signs of DVT.


Medications and IVs





Current Medications








 Medications


  (Trade)  Dose


 Ordered  Sig/Geoffrey


 Route  Start Time


 Stop Time Status Last Admin


 


 Lactated Ringer's  1,000 ml @ 


 150 mls/hr  Q6H40M


 IV  17 07:29


    17 07:51


 


 


  (NS Flush)  2 ml  BID


 IV FLUSH  17 21:00


    17 21:13


 


 


  (NS Flush)  2 ml  UNSCH  PRN


 IV FLUSH  17 10:00


     


 


 


  (Mylicon Chew)  80 mg  QID  PRN


 PO  17 10:00


    17 06:29


 


 


  (Elena-Colace)  2 tab  Q12H  PRN


 PO  17 10:00


    17 01:50


 


 


  (Ambien)  5 mg  HS  PRN


 PO  17 10:00


     


 


 


  (Zofran Inj)  4 mg  Q6H  PRN


 IV PUSH  17 10:00


     


 


 


  (Benadryl Inj)  25 mg  Q4H  PRN


 IV PUSH  17 14:30


     


 


 


  (Benadryl)  25 mg  Q4H  PRN


 PO  17 14:30


    17 14:51


 


 


  (Ofirmev 1000 Mg/


 100 ml Inj)  1,000 mg  Q6H


 IV  17 13:00


     


 


 


  (Morphine Inj)  4 mg  Q3H  PRN


 IV PUSH  17 12:45


     


 


 


  (Roxicodone)  5 mg  Q4H  PRN


 PO  17 12:45


     


 


 


  (Roxicodone)  10 mg  Q4H  PRN


 PO  17 12:45


     


 


 


  (Toradol Inj)  30 mg  Q6H  PRN


 IV PUSH  17 12:45


 17 12:44   


 








 (Landau,Michael A MD R2)





Assessment/Plan


Problem List:  


(1) Twin birth delivered by  section in hospital


ICD Codes:  Z38.31 - Twin liveborn infant, delivered by 


(2)  delivery delivered


ICD Codes:  O82 - Encounter for  delivery without indication


Assessment and Plan


27 y/o  female who is POD # 2 s/p  for  labor and twin 

gestation at 33-5/7 weeks gestation. Patient reports 10 out of 10 diffuse 

abdominal pain that is not helped by pain medications.  





1. abdominal pain, +BS, diffusely tender to palpation, uterus firm


-CBC to r/o intraperitoneal bleeding or hematoma. Post-op Hgb was stable at 9.0.


-Added IM Toradol and discontinued Motrin. We'll consider IV Tylenol and/or IV 

narcotics





2. routine postpartum care


-Continue routine postpartum care.


-Percocet and Toradol PRN pain.


-Encouraged OOB. Advised pelvic rest for 6 wks.  Will need a f/u appt. in 1 wk 

for incision check. 


-Re: birth ctrl, she is s/p bilateral tubal ligation.


-Discharge home likely tomorrow





dw Dr. Foley 


 (Landau,Michael A MD R2)











Landau,Michael A MD R2 Sep 2, 2017 15:10


Gabriel Foley MD Sep 8, 2017 17:44

## 2017-09-02 NOTE — RADRPT
EXAM DATE/TIME:  2017 17:33 

 

[facility] COMPARISON:     

No previous studies available for comparison.

 

 

INDICATIONS :     

Abdominal pain; two days post partum

                      

 

IV CONTRAST:     

100 cc Omnipaque 350 (iohexol) IV 

 

 

ORAL CONTRAST:      

No oral contrast ingested.

                      

 

RADIATION DOSE:     

9.96 CTDIvol (mGy) 

 

 

MEDICAL HISTORY :     

None  

 

SURGICAL HISTORY :      

None. None.

 

ENCOUNTER:      

Initial

 

ACUITY:      

1 day

 

PAIN SCALE:      

4/10

 

LOCATION:       

Bilateral  abdomen.

 

TECHNIQUE:     

Volumetric scanning of the abdomen and pelvis was performed.  Using automated exposure control and ad
justment of the mA and/or kV according to patient size, radiation dose was kept as low as reasonably 
achievable to obtain optimal diagnostic quality images.  DICOM format image data is available electro
nically for review and comparison.  

 

FINDINGS:     

Status post  with twins.

Minimal bibasilar parenchymal changes are seen in both lung base is.

Trace free air is present an expected

There is mild gaseous distention

There is symmetrical renal function

Liver and spleen are unremarkable

There is enlarged uterus measuring 10 cm x 12 cm that is following homogeneous debris.

Bladder and adnexal regions are unremarkable

I don't see active extravasation.

 

CONCLUSION:     Prominent uterus containing homogeneous debris

There is no active extravasation

Minimal bibasilar parenchymal changes in both lung bases

 

 

 Yariel Pete MD FACR on 2017 at 17:53           

Board Certified Radiologist.

 This report was verified electronically.

## 2017-09-03 VITALS
SYSTOLIC BLOOD PRESSURE: 93 MMHG | RESPIRATION RATE: 20 BRPM | TEMPERATURE: 97.2 F | DIASTOLIC BLOOD PRESSURE: 59 MMHG | HEART RATE: 96 BPM

## 2017-09-03 VITALS — DIASTOLIC BLOOD PRESSURE: 56 MMHG | SYSTOLIC BLOOD PRESSURE: 94 MMHG

## 2017-09-03 VITALS
DIASTOLIC BLOOD PRESSURE: 44 MMHG | TEMPERATURE: 99 F | HEART RATE: 105 BPM | SYSTOLIC BLOOD PRESSURE: 80 MMHG | RESPIRATION RATE: 24 BRPM

## 2017-09-03 VITALS
DIASTOLIC BLOOD PRESSURE: 37 MMHG | HEART RATE: 96 BPM | SYSTOLIC BLOOD PRESSURE: 75 MMHG | TEMPERATURE: 98.1 F | RESPIRATION RATE: 20 BRPM

## 2017-09-03 VITALS
HEART RATE: 84 BPM | RESPIRATION RATE: 18 BRPM | DIASTOLIC BLOOD PRESSURE: 64 MMHG | SYSTOLIC BLOOD PRESSURE: 93 MMHG | TEMPERATURE: 98.2 F

## 2017-09-03 VITALS — SYSTOLIC BLOOD PRESSURE: 94 MMHG | DIASTOLIC BLOOD PRESSURE: 56 MMHG

## 2017-09-03 VITALS
RESPIRATION RATE: 20 BRPM | HEART RATE: 82 BPM | DIASTOLIC BLOOD PRESSURE: 65 MMHG | SYSTOLIC BLOOD PRESSURE: 99 MMHG | TEMPERATURE: 98.3 F

## 2017-09-03 VITALS — SYSTOLIC BLOOD PRESSURE: 97 MMHG | HEART RATE: 106 BPM | DIASTOLIC BLOOD PRESSURE: 56 MMHG

## 2017-09-03 VITALS — DIASTOLIC BLOOD PRESSURE: 56 MMHG | SYSTOLIC BLOOD PRESSURE: 92 MMHG

## 2017-09-03 LAB
ALP SERPL-CCNC: 95 U/L (ref 45–117)
ALT SERPL-CCNC: 27 U/L (ref 10–53)
ANION GAP SERPL CALC-SCNC: 8 MEQ/L (ref 5–15)
AST SERPL-CCNC: 74 U/L (ref 15–37)
BASOPHILS # BLD AUTO: 0 TH/MM3 (ref 0–0.2)
BASOPHILS NFR BLD: 0.1 % (ref 0–2)
BILIRUB SERPL-MCNC: 0.6 MG/DL (ref 0.2–1)
BUN SERPL-MCNC: 12 MG/DL (ref 7–18)
CHLORIDE SERPL-SCNC: 110 MEQ/L (ref 98–107)
EOSINOPHIL # BLD: 0.3 TH/MM3 (ref 0–0.4)
EOSINOPHIL NFR BLD: 4.1 % (ref 0–4)
ERYTHROCYTE [DISTWIDTH] IN BLOOD BY AUTOMATED COUNT: 15 % (ref 11.6–17.2)
GFR SERPLBLD BASED ON 1.73 SQ M-ARVRAT: 136 ML/MIN (ref 89–?)
HCO3 BLD-SCNC: 23.5 MEQ/L (ref 21–32)
HCT VFR BLD CALC: 22.7 % (ref 35–46)
HEMO FLAGS: (no result)
LYMPHOCYTES # BLD AUTO: 0.2 TH/MM3 (ref 1–4.8)
LYMPHOCYTES NFR BLD AUTO: 2.5 % (ref 9–44)
MCH RBC QN AUTO: 29.3 PG (ref 27–34)
MCHC RBC AUTO-ENTMCNC: 33.2 % (ref 32–36)
MCV RBC AUTO: 88.3 FL (ref 80–100)
MONOCYTES NFR BLD: 3.9 % (ref 0–8)
MYELOCYTES NFR BLD: 2 % (ref 0–0)
NEUTROPHILS # BLD AUTO: 7.6 TH/MM3 (ref 1.8–7.7)
NEUTROPHILS NFR BLD AUTO: 89.4 % (ref 16–70)
NEUTS BAND # BLD MANUAL: 7.8 TH/MM3 (ref 1.8–7.7)
NEUTS BAND NFR BLD: 44 % (ref 0–6)
NEUTS SEG NFR BLD MANUAL: 46 % (ref 16–70)
PLAT MORPH BLD: (no result)
PLATELET # BLD: 86 TH/MM3 (ref 150–450)
PLATELET BLD QL SMEAR: (no result)
POTASSIUM SERPL-SCNC: 3.9 MEQ/L (ref 3.5–5.1)
RBC # BLD AUTO: 2.57 MIL/MM3 (ref 4–5.3)
SCAN/DIFF: (no result)
SODIUM SERPL-SCNC: 141 MEQ/L (ref 136–145)
WBC # BLD AUTO: 8.5 TH/MM3 (ref 4–11)
WBC DIFF SAMPLE: 100

## 2017-09-03 RX ADMIN — STANDARDIZED SENNA CONCENTRATE AND DOCUSATE SODIUM PRN TAB: 8.6; 5 TABLET, FILM COATED ORAL at 07:09

## 2017-09-03 RX ADMIN — CLINDAMYCIN PHOSPHATE SCH MLS/HR: 150 INJECTION, SOLUTION INTRAMUSCULAR; INTRAVENOUS at 01:39

## 2017-09-03 RX ADMIN — CLONIDINE HYDROCHLORIDE PRN MG: 0.1 INJECTION, SOLUTION EPIDURAL at 01:39

## 2017-09-03 RX ADMIN — CLINDAMYCIN PHOSPHATE SCH MLS/HR: 150 INJECTION, SOLUTION INTRAMUSCULAR; INTRAVENOUS at 13:40

## 2017-09-03 RX ADMIN — SIMETHICONE CHEW TAB 80 MG PRN MG: 80 TABLET ORAL at 20:12

## 2017-09-03 RX ADMIN — CLINDAMYCIN PHOSPHATE SCH MLS/HR: 150 INJECTION, SOLUTION INTRAMUSCULAR; INTRAVENOUS at 21:41

## 2017-09-03 RX ADMIN — CLONIDINE HYDROCHLORIDE PRN MG: 0.1 INJECTION, SOLUTION EPIDURAL at 07:09

## 2017-09-03 RX ADMIN — Medication SCH ML: at 20:14

## 2017-09-03 RX ADMIN — ACETAMINOPHEN SCH MG: 10 INJECTION, SOLUTION INTRAVENOUS at 04:14

## 2017-09-03 RX ADMIN — GENTAMICIN SULFATE SCH MLS/HR: 0.8 INJECTION, SOLUTION INTRAVENOUS at 22:31

## 2017-09-03 RX ADMIN — OXYTOCIN SCH MLS/HR: 10 INJECTION, SOLUTION INTRAMUSCULAR; INTRAVENOUS at 21:36

## 2017-09-03 RX ADMIN — SIMETHICONE CHEW TAB 80 MG PRN MG: 80 TABLET ORAL at 07:09

## 2017-09-03 RX ADMIN — CLONIDINE HYDROCHLORIDE PRN MG: 0.1 INJECTION, SOLUTION EPIDURAL at 20:13

## 2017-09-03 RX ADMIN — CLONIDINE HYDROCHLORIDE PRN MG: 0.1 INJECTION, SOLUTION EPIDURAL at 14:47

## 2017-09-03 RX ADMIN — STANDARDIZED SENNA CONCENTRATE AND DOCUSATE SODIUM PRN TAB: 8.6; 5 TABLET, FILM COATED ORAL at 20:12

## 2017-09-03 RX ADMIN — ACETAMINOPHEN SCH MG: 10 INJECTION, SOLUTION INTRAVENOUS at 21:38

## 2017-09-03 RX ADMIN — SIMETHICONE CHEW TAB 80 MG PRN MG: 80 TABLET ORAL at 14:55

## 2017-09-03 NOTE — HHI.OB
Subjective


Post Operative Day:  3


Remarks


Postoperative day 3 , patient ran a low-grade fever yesterday of 100.0 and 99.9 

,BP has been running low throughout hospitalization yesterday blood pressure 

systolic in then 70 and 80 range and diastolic pressures in the 30s and 40s and 

50s , pulse was at times in the 140s and decreased to the 120s and a now of 

about 96, yesterday a rapid response team was called due to a systolic blood 

pressure less than 70, patient was seen that time noted to be in quite a bit of 

abdominal pain.  The CT scan of the abdomen pelvis was done which was negative 

for free fluid or intra-abdominal hemorrhage, Hernandez was placed in the patient's 

had adequate urine output and her hemoglobin is remains stable.  Day  2 

gentamicin and clindamycin IV





Clinically the patient now looks very stable but still has a moderate amount of 

abdominal pain and request pain medications continually, this morning her blood 

pressure is 93/60 pulse is 96





Objective


Vitals/I&O





Vital Signs








  Date Time  Temp Pulse Resp B/P (MAP) Pulse Ox O2 Delivery O2 Flow Rate FiO2


 


9/3/17 04:48 98.1 96 20 75/37 (50)    


 


9/3/17 00:30 99.0 105 24     


 


9/3/17 00:30    80/44 (56)    


 


17 23:00 99.4 121 24 70/40 (50)    


 


17 22:46 99.4 121 24 70/40 (50)    


 


17 19:30    70/30 (43)    


 


17 19:30 99.9  24     


 


17 18:00 99.9       


 


17 18:00  126 16 80/35 (50) 98   


 


17 17:05 100.0 142 24 97/52 (67) 97   


 


17 17:00   20     


 


17 16:55    76/38 (51)    


 


17 16:55   20     


 


17 16:55 99.9       


 


17 16:55    78/36 (50)    


 


17 16:55  119      








Result Diagram:  


17





Objective Remarks


VITALS: pulse 121


GENERAL: Well-nourished, well-developed patient.


CARDIOVASCULAR: Regular rate and rhythm, II/IV holosystolic murmur noted likely 

physiologic murmur of preg, gallops, or rubs. 


RESPIRATORY: Breath sounds equal bilaterally. No accessory muscle use.


ABDOMEN/GI: Abdomen distended 3+, diffusely tender, bowel sounds deminished  . 

No rebound tenderness.


   Incision: Clean, dry and intact.


   Fundus: Firm, -tender at umbilicus.


GENITOURINARY: Light to moderate bleeding.


EXTREMITIES: No cyanosis or edema, non-tender, without signs of DVT.


Medications and IVs





Current Medications








 Medications


  (Trade)  Dose


 Ordered  Sig/Geoffrey


 Route  Start Time


 Stop Time Status Last Admin


 


 Lactated Ringer's  1,000 ml @ 


 150 mls/hr  Q6H40M


 IV  17 07:29


    17 07:51


 


 


  (NS Flush)  2 ml  BID


 IV FLUSH  17 21:00


    17 21:13


 


 


  (NS Flush)  2 ml  UNSCH  PRN


 IV FLUSH  17 10:00


     


 


 


  (Mylicon Chew)  80 mg  QID  PRN


 PO  17 10:00


    9/3/17 07:09


 


 


  (Elena-Colace)  2 tab  Q12H  PRN


 PO  17 10:00


    9/3/17 07:09


 


 


  (Ambien)  5 mg  HS  PRN


 PO  17 10:00


     


 


 


  (Zofran Inj)  4 mg  Q6H  PRN


 IV PUSH  17 10:00


     


 


 


  (Benadryl Inj)  25 mg  Q4H  PRN


 IV PUSH  17 14:30


     


 


 


  (Benadryl)  25 mg  Q4H  PRN


 PO  17 14:30


    17 14:51


 


 


  (Morphine Inj)  4 mg  Q3H  PRN


 IV PUSH  17 12:45


     


 


 


  (Roxicodone)  5 mg  Q4H  PRN


 PO  17 12:45


     


 


 


  (Roxicodone)  10 mg  Q4H  PRN


 PO  17 12:45


    17 15:28


 


 


  (Toradol Inj)  30 mg  Q6H  PRN


 IV PUSH  17 12:45


 17 12:44  9/3/17 07:09


 


 


 Clindamycin


 Phosphate 900 mg/


 Sodium Chloride  106 ml @ 


 212 mls/hr  Q8H


 IV  17 18:00


    9/3/17 01:39


 


 


  (Ofirmev 1000 Mg/


 100 ml Inj)  1,000 mg  Q6H


 IV  17 21:30


    9/3/17 04:14


 


 


 Gentamicin


 Sulfate/Sodium


 Chloride  100 ml @ 


 200 mls/hr  Q8H


 IV  9/3/17 03:00


    9/3/17 03:26


 











Assessment/Plan


Problem List:  


(1) Twin birth delivered by  section in hospital


ICD Codes:  Z38.31 - Twin liveborn infant, delivered by 


(2)  delivery delivered


ICD Codes:  O82 - Encounter for  delivery without indication


Assessment and Plan


27 y/o  female who is POD # 2 s/p  for  labor and twin 

gestation at 33-5/7 weeks gestation. Patient reports 10 out of 10 diffuse 

abdominal pain that is not helped by pain medications.  





1. abdominal pain, BS minimal , diffusely tender to palpation, uterus firm


-CBC to r/o intraperitoneal bleeding or hematoma. Post-op Hgb was stable at 9.0.


-Added IM Toradol and discontinued Motrin. We'll consider IV Tylenol and/or IV 

narcotics


postop ileus however she has had a BM 


postop metritis ? - on IVATB for another day at least


2. routine postpartum care advance ambulation , monitor BP & pulse.


-Percocet and Toradol PRN pain.


-Encouraged OOB. Advised pelvic rest for 6 wks.  Will need a f/u appt. in 1 wk 

for incision check. 


-Re: birth ctrl, she is s/p bilateral tubal ligation.


-Discharge home likely tomorrow





Kuldip Singh Dr., II, MD Sep 3, 2017 08:18

## 2017-09-03 NOTE — PD.OB.DELI
Weeks gestation:  41


Gest age assessed date:  Sep 3, 2017


Gest age assessed time:  05:00


Pt started active labor?:  Yes


Medical induction of labor?:  No


Artificial rupture of membrane:  No (Pt pushed for 1 hour with good effort but 

minimal descent.)


Anesthesia:  Epidural, Lidocaine local to perineum


Episiotomy:  None


Vaginal Delivery:  Vacuum


Presentation:  Occiput posterior


Nuchal Cord:  x1


Infant:  Male


Delivery date:  Sep 3, 2017


Delivery time:  15:12


One Minute APGAR:  7


Five Minute APGAR:  9


Birth Weight:  7 pounds 6 oz


Placenta:  Spontaneous delivery


Laceration:  Vaginal laceration, 1 deg


Repair:  Vicryl interrupted


Estimated blood loss:  150cc


Additional Information


Pt was pushing for 1 hour with good effort but with minimal descent.


Vertex with ROP.


Bladder was emptied with straight catheter.


Station +1.


Good descent with Kiwi  vacuum applied at occiput.


Head delivered over 2 contractions.


No pop-offs.


Nuchal cord x 1, easily reduced.


cord clamped and divided and infant placed on mother's abdomen.


Placenta membranes delivered spont, complete. 3 vessel cord.


1st degree right labial laceration repaired with 3.0 Vicryl interrupted.











Cliff Davis MD Sep 3, 2017 15:40

## 2017-09-04 VITALS
RESPIRATION RATE: 14 BRPM | DIASTOLIC BLOOD PRESSURE: 64 MMHG | HEART RATE: 74 BPM | TEMPERATURE: 97.9 F | SYSTOLIC BLOOD PRESSURE: 99 MMHG

## 2017-09-04 VITALS
TEMPERATURE: 97.8 F | RESPIRATION RATE: 20 BRPM | DIASTOLIC BLOOD PRESSURE: 70 MMHG | HEART RATE: 83 BPM | SYSTOLIC BLOOD PRESSURE: 105 MMHG

## 2017-09-04 VITALS
HEART RATE: 68 BPM | SYSTOLIC BLOOD PRESSURE: 101 MMHG | TEMPERATURE: 97.9 F | RESPIRATION RATE: 20 BRPM | DIASTOLIC BLOOD PRESSURE: 68 MMHG

## 2017-09-04 LAB
BASOPHILS # BLD AUTO: 0 TH/MM3 (ref 0–0.2)
BASOPHILS NFR BLD: 0.5 % (ref 0–2)
DOHLE BOD BLD QL SMEAR: PRESENT
EOSINOPHIL # BLD: 0.5 TH/MM3 (ref 0–0.4)
EOSINOPHIL NFR BLD: 14.1 % (ref 0–4)
EOSINOPHIL NFR BLD: 8 % (ref 0–4)
ERYTHROCYTE [DISTWIDTH] IN BLOOD BY AUTOMATED COUNT: 15.3 % (ref 11.6–17.2)
HCT VFR BLD CALC: 30 % (ref 35–46)
HEMO FLAGS: (no result)
LYMPHOCYTES # BLD AUTO: 0.8 TH/MM3 (ref 1–4.8)
LYMPHOCYTES NFR BLD AUTO: 21.1 % (ref 9–44)
MCH RBC QN AUTO: 28.8 PG (ref 27–34)
MCHC RBC AUTO-ENTMCNC: 32.7 % (ref 32–36)
MCV RBC AUTO: 88.2 FL (ref 80–100)
METAMYELOCYTES NFR BLD: 3 % (ref 0–1)
MONOCYTES NFR BLD: 7.2 % (ref 0–8)
MYELOCYTES NFR BLD: 1 % (ref 0–0)
NEUTROPHILS # BLD AUTO: 2.2 TH/MM3 (ref 1.8–7.7)
NEUTROPHILS NFR BLD AUTO: 57.1 % (ref 16–70)
NEUTS BAND # BLD MANUAL: 2.9 TH/MM3 (ref 1.8–7.7)
NEUTS BAND NFR BLD: 24 % (ref 0–6)
NEUTS SEG NFR BLD MANUAL: 48 % (ref 16–70)
PLAT MORPH BLD: (no result)
PLATELET # BLD: 94 TH/MM3 (ref 150–450)
PLATELET BLD QL SMEAR: (no result)
RBC # BLD AUTO: 3.4 MIL/MM3 (ref 4–5.3)
SCAN/DIFF: (no result)
WBC # BLD AUTO: 3.8 TH/MM3 (ref 4–11)
WBC DIFF SAMPLE: 100
WBC NRBC COR # BLD: 1 /100 WBC (ref 0–0)

## 2017-09-04 RX ADMIN — ACETAMINOPHEN SCH MG: 10 INJECTION, SOLUTION INTRAVENOUS at 09:47

## 2017-09-04 RX ADMIN — STANDARDIZED SENNA CONCENTRATE AND DOCUSATE SODIUM PRN TAB: 8.6; 5 TABLET, FILM COATED ORAL at 09:46

## 2017-09-04 RX ADMIN — SIMETHICONE CHEW TAB 80 MG PRN MG: 80 TABLET ORAL at 10:54

## 2017-09-04 RX ADMIN — CLINDAMYCIN PHOSPHATE SCH MLS/HR: 150 INJECTION, SOLUTION INTRAMUSCULAR; INTRAVENOUS at 14:00

## 2017-09-04 RX ADMIN — CLONIDINE HYDROCHLORIDE PRN MG: 0.1 INJECTION, SOLUTION EPIDURAL at 19:22

## 2017-09-04 RX ADMIN — ACETAMINOPHEN SCH MG: 10 INJECTION, SOLUTION INTRAVENOUS at 22:31

## 2017-09-04 RX ADMIN — ACETAMINOPHEN SCH MG: 10 INJECTION, SOLUTION INTRAVENOUS at 04:28

## 2017-09-04 RX ADMIN — Medication PRN ML: at 04:27

## 2017-09-04 RX ADMIN — CLINDAMYCIN PHOSPHATE SCH MLS/HR: 150 INJECTION, SOLUTION INTRAMUSCULAR; INTRAVENOUS at 22:32

## 2017-09-04 RX ADMIN — GENTAMICIN SULFATE SCH MLS/HR: 0.8 INJECTION, SOLUTION INTRAVENOUS at 22:32

## 2017-09-04 RX ADMIN — CLONIDINE HYDROCHLORIDE PRN MG: 0.1 INJECTION, SOLUTION EPIDURAL at 10:54

## 2017-09-04 RX ADMIN — CLINDAMYCIN PHOSPHATE SCH MLS/HR: 150 INJECTION, SOLUTION INTRAMUSCULAR; INTRAVENOUS at 06:08

## 2017-09-04 RX ADMIN — OXYTOCIN SCH MLS/HR: 10 INJECTION, SOLUTION INTRAMUSCULAR; INTRAVENOUS at 04:07

## 2017-09-04 RX ADMIN — SIMETHICONE CHEW TAB 80 MG PRN MG: 80 TABLET ORAL at 04:28

## 2017-09-04 RX ADMIN — GENTAMICIN SULFATE SCH MLS/HR: 0.8 INJECTION, SOLUTION INTRAVENOUS at 06:08

## 2017-09-04 RX ADMIN — Medication SCH ML: at 20:38

## 2017-09-04 RX ADMIN — ACETAMINOPHEN SCH MG: 10 INJECTION, SOLUTION INTRAVENOUS at 15:30

## 2017-09-04 RX ADMIN — OXYTOCIN SCH MLS/HR: 10 INJECTION, SOLUTION INTRAMUSCULAR; INTRAVENOUS at 18:09

## 2017-09-04 RX ADMIN — GENTAMICIN SULFATE SCH MLS/HR: 0.8 INJECTION, SOLUTION INTRAVENOUS at 15:00

## 2017-09-04 RX ADMIN — CLONIDINE HYDROCHLORIDE PRN MG: 0.1 INJECTION, SOLUTION EPIDURAL at 04:27

## 2017-09-04 NOTE — HHI.OB
Subjective


Post Operative Day:  4


Remarks


Postoperative day number 4. Afebrile overnight. Blood pressure stable in range 

of 90s/50s. States pain is still 10/10, said he has remained stable, not 

worsening. Says pain medications don't really help that well. CT of abdomen/

pelvis was negative. Patient is on gentamicin and clindamycin IV. Incision not 

draining. Decreased lochia.  Denies dysuria.  No breast tenderness. Appetite 

good. Occasional nausea. Endorses flatus. Endorses bowel movement.  Ambulating 

well.  Denies calf pain, shortness of breath, or cough.





Objective


Vitals/I&O





Vital Signs








  Date Time  Temp Pulse Resp B/P (MAP) Pulse Ox O2 Delivery O2 Flow Rate FiO2


 


17 07:32 97.9       


 


17 07:32  74 14 99/64 (76)    


 


17 04:28    105/70 (82)    


 


17 04:28 97.8 83 20     


 


9/3/17 23:50    93/64 (74)    


 


9/3/17 23:50 98.2 84 18     


 


9/3/17 20:13 98.3 82 20 99/65 (76)    


 


9/3/17 15:18  106  97/56 (70)    


 


9/3/17 13:27    92/56 (68)    


 


9/3/17 12:13    94/56 (69)    


 


9/3/17 12:03    94/56 (69)    








Result Diagram:  


9/3/17 0906                                                                    

            9/3/17 0906





Objective Remarks


GENERAL: Well-nourished, well-developed patient.


CARDIOVASCULAR: Regular rate and rhythm


RESPIRATORY: Breath sounds equal bilaterally. No accessory muscle use.


ABDOMEN/GI: Abdomen distended, diffusely tender, especially on left abdomen. No 

rebound tenderness.


   Incision: Clean, dry and intact.


   Fundus: Firm, -tender at umbilicus.


GENITOURINARY: Light to moderate bleeding.


EXTREMITIES: No cyanosis or edema, non-tender, without signs of DVT.


Medications and IVs





Current Medications








 Medications


  (Trade)  Dose


 Ordered  Sig/Geoffrey


 Route  Start Time


 Stop Time Status Last Admin


 


 Lactated Ringer's  1,000 ml @ 


 150 mls/hr  Q6H40M


 IV  17 07:29


    17 07:51


 


 


  (NS Flush)  2 ml  BID


 IV FLUSH  17 21:00


    9/3/17 20:14


 


 


  (NS Flush)  2 ml  UNSCH  PRN


 IV FLUSH  17 10:00


    17 04:27


 


 


  (Mylicon Chew)  80 mg  QID  PRN


 PO  17 10:00


    17 04:28


 


 


  (Elena-Colace)  2 tab  Q12H  PRN


 PO  17 10:00


    9/3/17 20:12


 


 


  (Ambien)  5 mg  HS  PRN


 PO  17 10:00


     


 


 


  (Zofran Inj)  4 mg  Q6H  PRN


 IV PUSH  17 10:00


    17 04:27


 


 


  (Benadryl Inj)  25 mg  Q4H  PRN


 IV PUSH  17 14:30


     


 


 


  (Benadryl)  25 mg  Q4H  PRN


 PO  17 14:30


    17 14:51


 


 


  (Morphine Inj)  4 mg  Q3H  PRN


 IV PUSH  17 12:45


     


 


 


  (Roxicodone)  5 mg  Q4H  PRN


 PO  17 12:45


    9/3/17 12:09


 


 


  (Roxicodone)  10 mg  Q4H  PRN


 PO  17 12:45


    17 04:28


 


 


  (Toradol Inj)  30 mg  Q6H  PRN


 IV PUSH  17 12:45


 17 12:44  17 04:27


 


 


  (Ofirmev 1000 Mg/


 100 ml Inj)  1,000 mg  Q6H


 IV  17 21:30


    17 04:28


 


 


 Clindamycin


 Phosphate 900 mg/


 Sodium Chloride  106 ml @ 


 212 mls/hr  Q8H


 IV  9/3/17 22:00


    17 06:08


 


 


 Gentamicin


 Sulfate/Sodium


 Chloride  100 ml @ 


 200 mls/hr  Q8H


 IV  9/3/17 23:00


    17 06:08


 











Assessment/Plan


Problem List:  


(1) Twin birth delivered by  section in hospital


ICD Codes:  Z38.31 - Twin liveborn infant, delivered by 


(2)  delivery delivered


ICD Codes:  O82 - Encounter for  delivery without indication


Assessment and Plan


27 y/o  female who is POD #4 s/p  for  labor and twin 

gestation at 33-5/7 weeks gestation. Patient reports continued 10 out of 10 

diffuse abdominal pain that is not helped by pain medications.  





1. abdominal pain, BS minimal , diffusely tender to palpation, uterus firm


-CBC to r/o intraperitoneal bleeding or hematoma. Post-op Hgb was stable at 9.0.


   -Repeat pending today


-Added IM Toradol; IV Tylenol and/or IV narcotics


-Ileus vs endometritis; on antibiotics.





2. routine postpartum care advance ambulation , monitor BP & pulse.


-Encouraged OOB. Advised pelvic rest for 6 wks.  Will need a f/u appt. in 1 wk 

for incision check. 


-Re: birth ctrl, she is s/p bilateral tubal ligation.





dw OB attending











Wes Crews MD, R2 Sep 4, 2017 09:31

## 2017-09-05 VITALS
DIASTOLIC BLOOD PRESSURE: 61 MMHG | TEMPERATURE: 98.1 F | HEART RATE: 61 BPM | RESPIRATION RATE: 18 BRPM | SYSTOLIC BLOOD PRESSURE: 101 MMHG

## 2017-09-05 VITALS
RESPIRATION RATE: 18 BRPM | SYSTOLIC BLOOD PRESSURE: 97 MMHG | TEMPERATURE: 97.9 F | DIASTOLIC BLOOD PRESSURE: 62 MMHG | HEART RATE: 56 BPM

## 2017-09-05 RX ADMIN — OXYTOCIN SCH MLS/HR: 10 INJECTION, SOLUTION INTRAMUSCULAR; INTRAVENOUS at 00:49

## 2017-09-05 RX ADMIN — Medication PRN ML: at 03:45

## 2017-09-05 RX ADMIN — ACETAMINOPHEN SCH MG: 10 INJECTION, SOLUTION INTRAVENOUS at 03:45

## 2017-09-05 RX ADMIN — CLINDAMYCIN PHOSPHATE SCH MLS/HR: 150 INJECTION, SOLUTION INTRAMUSCULAR; INTRAVENOUS at 06:24

## 2017-09-05 RX ADMIN — GENTAMICIN SULFATE SCH MLS/HR: 0.8 INJECTION, SOLUTION INTRAVENOUS at 06:24

## 2017-09-05 RX ADMIN — CLONIDINE HYDROCHLORIDE PRN MG: 0.1 INJECTION, SOLUTION EPIDURAL at 09:16

## 2017-09-05 RX ADMIN — OXYTOCIN SCH MLS/HR: 10 INJECTION, SOLUTION INTRAMUSCULAR; INTRAVENOUS at 06:42

## 2017-09-05 RX ADMIN — ACETAMINOPHEN SCH MG: 10 INJECTION, SOLUTION INTRAVENOUS at 09:16

## 2017-09-05 RX ADMIN — CLONIDINE HYDROCHLORIDE PRN MG: 0.1 INJECTION, SOLUTION EPIDURAL at 03:45

## 2017-09-05 NOTE — HHI.DCPOC
Discharge Care Plan


Diagnosis:  


(1) Twin birth delivered by  section in hospital


Report Symptoms to Your Doctor


-Temperature above 100.5 degrees


-Redness, of incision or excessive or foul smelling drainage


-Unusual pain or calf pain


-Increased vaginal bleeding


-Painful or difficulty urinating


-Feelings of extreme sadness or anxiety after 2 weeks


Goals to Promote Your Health


* To prevent worsening of your condition and complications


* To maintain your health at the optimal level


Directions to Meet Your Goals


*** Take your medications as prescribed


*** Follow your dietary instruction


*** Follow activity as directed


*** Ensure plenty of rest for recovery


*** Drink fluids for hydration








*** Keep your appointments as scheduled


*** Take your immunizations and boosters as scheduled


*** If your symptoms worsen call your PCP, if no PCP go to Urgent Care Center 

or Emergency Room***


*** Smoking is Dangerous to Your Health. Avoid second hand smoke***


***Call the 24-hour crisis hotline for domestic abuse at 1-849.972.3098***











Shady Godwin MD R2 Sep 5, 2017 08:21

## 2017-09-26 ENCOUNTER — HOSPITAL ENCOUNTER (EMERGENCY)
Dept: HOSPITAL 17 - NEPD | Age: 27
Discharge: HOME | End: 2017-09-26
Payer: MEDICAID

## 2017-09-26 VITALS
RESPIRATION RATE: 16 BRPM | DIASTOLIC BLOOD PRESSURE: 73 MMHG | SYSTOLIC BLOOD PRESSURE: 114 MMHG | HEART RATE: 91 BPM | TEMPERATURE: 99.1 F | OXYGEN SATURATION: 98 %

## 2017-09-26 VITALS — BODY MASS INDEX: 24.75 KG/M2 | WEIGHT: 134.48 LBS | HEIGHT: 62 IN

## 2017-09-26 DIAGNOSIS — O91.23: Primary | ICD-10-CM

## 2017-09-26 PROCEDURE — 99283 EMERGENCY DEPT VISIT LOW MDM: CPT

## 2017-09-26 NOTE — PD
HPI


Chief Complaint:  Pain: Acute or Chronic


Time Seen by Provider:  00:23


Travel History


International Travel<30 days:  No


Contact w/Intl Traveler<30days:  No


Traveled to known affect area:  No





History of Present Illness


HPI


26 year-old female who is 3 weeks status post  of twins, presents to 

emergency department for evaluation of left breast pain.  Patient states this 

started this morning.  It has worsened and is along the left lateral breast.  

It is a 6 out of 10, constant, burning but with palpation it exacerbates to a 

10 out of 10.  She states she has been unable to breast-feed out of it today as 

there is no milk.  She states this is happening with the previous pregnancy and 

she got a really bad infection.  She has not had any fever or chills.  Denies 

any trauma.  She has no other symptoms to report.





PFSH


Past Medical History


Hx Anticoagulant Therapy:  No


Cardiovascular Problems:  No


Chemotherapy:  No


Cerebrovascular Accident:  No


Diabetes:  No


Diminished Hearing:  No


Respiratory:  Yes (pna as a child)


Immunizations Current:  Yes


Influenza Vaccination:  No


Pregnant?:  Not Pregnant


:  6


Para:  5


Miscarriage:  0


:  1





Past Surgical History


 Section:  Yes (x3)





Social History


Alcohol Use:  No


Tobacco Use:  No


Substance Use:  No





Allergies-Medications


(Allergen,Severity, Reaction):  


Coded Allergies:  


     No Known Allergies (Unverified , 17)


Reported Meds & Prescriptions





Reported Meds & Active Scripts


Active


Ibuprofen 600 Mg Tab 600 Mg PO Q8H PRN


Keflex (Cephalexin) 500 Mg Cap 500 Mg PO Q6H 5 Days








Review of Systems


Except as stated in HPI:  all other systems reviewed are Neg





Physical Exam


Narrative


GENERAL: Well-nourished, well-developed female patient, ambulatory in no acute 

distress


SKIN: Focused skin assessment warm/dry.  There is slight erythema to the 

lateral aspect of the left breast standing from 2:00 to 5:00 and toward the 

nipple.  There is no discharge from the nipple.  There is no induration.  The 

breast is soft.  It is tender to palpate along the left lateral aspect of it.


HEAD: Normocephalic.


EYES: No scleral icterus. No injection or drainage. 


NECK: Supple, trachea midline. No JVD or lymphadenopathy.


CARDIOVASCULAR: Regular rate and rhythm without murmurs, gallops, or rubs. 


RESPIRATORY: Breath sounds equal bilaterally. No accessory muscle use.


GASTROINTESTINAL: Abdomen soft, non-tender, nondistended. 


MUSCULOSKELETAL: No cyanosis, or edema. 


BACK: Nontender without obvious deformity. No CVA tenderness.





Data


Data


Last Documented VS





Vital Signs








  Date Time  Temp Pulse Resp B/P (MAP) Pulse Ox O2 Delivery O2 Flow Rate FiO2


 


17 00:19 99.1 91 16 114/73 (87) 98 Room Air  








Orders





 Orders


Ibuprofen (Motrin) (17 00:45)


Cephalexin (Keflex) (17 00:45)








MDM


Medical Decision Making


Medical Screen Exam Complete:  Yes


Emergency Medical Condition:  Yes


Medical Record Reviewed:  Yes


Differential Diagnosis


Mastitis versus clogged milk duct versus cellulitis


Narrative Course


26 year-old female presents to emergency partner for evaluation left breast 

pain.  This is likely an early mastitis.  I have counseled the patient on care.

  She is given pain control and a first dose of antibiotic here in the 

emergency department.  She is encouraged to follow-up with her obstetrician and 

return immediately with any acute worsening of symptoms.





Diagnosis





 Primary Impression:  


 Mastitis, left, acute


Referrals:  


Obstetrician





Primary Care Physician


Patient Instructions:  General Instructions, Mastitis (ED)





***Additional Instructions:  


WARM COMPRESSES AND LIGHT MASSAGE


CONTINUE TO PUMP OR BREASTFEED


FOLLOW UP WITH YOUR OBSTETRICIAN


FOLLOW UP WITH YOUR PRIMARY CARE PROVIDER


RETURN TO ED WITH ACUTE WORSENING OF SYMPTOMS


***Med/Other Pt SpecificInfo:  Prescription(s) given


Scripts


Ibuprofen (Ibuprofen) 600 Mg Tab


600 MG PO Q8H Y for PAIN, #30 TAB 0 Refills


   Prov: Terrie Schreiber         17 


Cephalexin (Keflex) 500 Mg Cap


500 MG PO Q6H for Infection for 5 Days, #20 CAP 0 Refills


   Prov: Terrie Schreiber         17


Disposition:  01 DISCHARGE HOME


Condition:  Stable











Terrie Schreiber Sep 26, 2017 00:42

## 2017-11-16 ENCOUNTER — HOSPITAL ENCOUNTER (EMERGENCY)
Dept: HOSPITAL 17 - NEPD | Age: 27
Discharge: HOME | End: 2017-11-16
Payer: MEDICAID

## 2017-11-16 VITALS
DIASTOLIC BLOOD PRESSURE: 69 MMHG | SYSTOLIC BLOOD PRESSURE: 109 MMHG | OXYGEN SATURATION: 100 % | HEART RATE: 63 BPM | RESPIRATION RATE: 15 BRPM | TEMPERATURE: 98.4 F

## 2017-11-16 DIAGNOSIS — K64.4: Primary | ICD-10-CM

## 2017-11-16 PROCEDURE — 99283 EMERGENCY DEPT VISIT LOW MDM: CPT

## 2017-11-16 NOTE — PD
HPI


Chief Complaint:  GI Complaint


Time Seen by Provider:  20:59


Travel History


International Travel<30 days:  No


Contact w/Intl Traveler<30days:  No


Traveled to known affect area:  No





History of Present Illness


HPI


Patient is a 26-year-old female who presents to ER with c/o of rectal pain. 

Patient reports that she has noticed a "bump" from her anus starting on Monday. 

Patient reports that she is having pain with defecation and pain with passing 

gas.  Reports that she tried to push the bumps back into her anus with no 

avail.  Denies blood in stools. Denies abdominal pain. Denies n/v. No other c/o.





PFSH


Past Medical History


Medical History:  Denies Significant Hx


Hx Anticoagulant Therapy:  No


Cardiovascular Problems:  No


Chemotherapy:  No


Cerebrovascular Accident:  No


Diabetes:  No


Diminished Hearing:  No


Respiratory:  Yes (pna as a child)


Immunizations Current:  Yes


Tetanus Vaccination:  < 5 Years


Pregnant?:  Not Pregnant


LMP:  17


:  6


Para:  5


Miscarriage:  0


:  1





Past Surgical History


Surgical History:  No Previous Surgery


 Section:  Yes (x3)





Social History


Alcohol Use:  No


Tobacco Use:  No


Substance Use:  No





Allergies-Medications


(Allergen,Severity, Reaction):  


Coded Allergies:  


     No Known Allergies (Verified  Adverse Reaction, Unknown, 17)


Reported Meds & Prescriptions





Reported Meds & Active Scripts


Active


Reported


Prenatal Plus Iron 29-1 mg (Prenatal Vit-Iron Carbonyl) 29 Mg Iron-1 Mg Tab 1 

Tab PO DAILY








Review of Systems


General / Constitutional:  No: Fever


Eyes:  No: Visual changes


HENT:  No: Headaches


Cardiovascular:  No: Chest Pain or Discomfort


Respiratory:  No: Shortness of Breath


Gastrointestinal:  Positive: Other (rectal pain), No: Nausea, Vomiting, Diarrhea

, Abdominal Pain, Constipation


Genitourinary:  No: Dysuria


Musculoskeletal:  No: Pain


Skin:  No Rash


Neurologic:  No: Weakness


Psychiatric:  No: Depression


Endocrine:  No: Polydipsia


Hematologic/Lymphatic:  No: Easy Bruising





Physical Exam


Narrative


GENERAL: NAD, Nontoxic


SKIN: Focused skin assessment warm/dry.


HEAD: Atraumatic. Normocephalic. 


EYES: Pupils equal and round. No scleral icterus. No injection or drainage. 


ENT: No nasal bleeding or discharge.  Mucous membranes pink and moist.


NECK: Trachea midline. No JVD. 


CARDIOVASCULAR: Regular rate and rhythm.  No murmur appreciated.


RESPIRATORY: No accessory muscle use. Clear to auscultation. Breath sounds 

equal bilaterally. 


GASTROINTESTINAL: Abdomen soft, non-tender, nondistended. Hepatic and splenic 

margins not palpable. Rectal exam: Patient with non-thrombosed hemorrhoids on 

exam


MUSCULOSKELETAL: No obvious deformities. No clubbing.  No cyanosis.  No edema. 


NEUROLOGICAL: Awake and alert. No obvious cranial nerve deficits.  Motor 

grossly within normal limits. Normal speech.


PSYCHIATRIC: Appropriate mood and affect; insight and judgment normal.





Data


Data


Last Documented VS





Vital Signs








  Date Time  Temp Pulse Resp B/P (MAP) Pulse Ox O2 Delivery O2 Flow Rate FiO2


 


17 19:45 98.4 63 15 109/69 (82) 100 Room Air  











[facility]


Medical Decision Making


Medical Screen Exam Complete:  Yes


Emergency Medical Condition:  Yes


Medical Record Reviewed:  Yes


Interpretation(s)





Vital Signs








  Date Time  Temp Pulse Resp B/P (MAP) Pulse Ox O2 Delivery O2 Flow Rate FiO2


 


17 19:45 98.4 63 15 109/69 (82) 100 Room Air  








Differential Diagnosis


Hemorrhoids


Narrative Course


26-year-old female sent to the emergency room with complaints of external 

hemorrhoids.  Hemorrhoids are nonthrombosed, discussed need for sitz bath, 

hemorrhoid creams, hemorrhoid suppositories as well as stool softeners.  

Patient will follow-up with general surgery if she wishes to have hemorrhoids 

surgically removed





Diagnosis





 Primary Impression:  


 External hemorrhoid


Patient Instructions:  General Instructions





***Additional Instructions:  


Please follow up with general surgery as needed 





Please follow up with your primary care doctor in 2-3 days





Return to the ER if symptoms worsen or progress





Return to the ER as needed


***Med/Other Pt SpecificInfo:  Prescription(s) given


Scripts


Polyethylene Glycol 3350 Powder (Miralax Powder) 17 Gm Powd


17 GM PO DAILY for Constipation, #1 CAN 0 Refills


   Mix and dissolve one measuring cap-ful (17 grams) in water or juice.


   Prov: Hilda Cote DO         17 


Witch Hazel Topical (Tucks Medicated Topical) 50 % Pad


1 PAD TOPICAL Q4H Y for PAIN/INFLAMMATION, #1 CONTAINER 0 Refills


   Prov: Hilda Cote DO         17 


Hydrocortisone Supp (Anusol-Hc Supp) 25 Mg Supp


25 MG RECTAL BID, #12 SUPP


   Prov: Hilda Cote DO         17


Disposition:  01 DISCHARGE HOME


Condition:  Stable











Hilda Cote DO 2017 21:21

## 2018-02-03 ENCOUNTER — HOSPITAL ENCOUNTER (EMERGENCY)
Dept: HOSPITAL 17 - PHED | Age: 28
Discharge: HOME | End: 2018-02-03
Payer: MEDICAID

## 2018-02-03 VITALS
RESPIRATION RATE: 16 BRPM | HEART RATE: 72 BPM | SYSTOLIC BLOOD PRESSURE: 105 MMHG | DIASTOLIC BLOOD PRESSURE: 60 MMHG | OXYGEN SATURATION: 97 %

## 2018-02-03 VITALS — BODY MASS INDEX: 28.89 KG/M2 | WEIGHT: 143.3 LBS | HEIGHT: 59 IN

## 2018-02-03 VITALS — DIASTOLIC BLOOD PRESSURE: 66 MMHG | SYSTOLIC BLOOD PRESSURE: 99 MMHG

## 2018-02-03 VITALS — RESPIRATION RATE: 16 BRPM

## 2018-02-03 VITALS
RESPIRATION RATE: 18 BRPM | HEART RATE: 82 BPM | OXYGEN SATURATION: 98 % | TEMPERATURE: 98.1 F | DIASTOLIC BLOOD PRESSURE: 67 MMHG | SYSTOLIC BLOOD PRESSURE: 107 MMHG

## 2018-02-03 DIAGNOSIS — R10.11: Primary | ICD-10-CM

## 2018-02-03 DIAGNOSIS — R11.2: ICD-10-CM

## 2018-02-03 DIAGNOSIS — Z79.899: ICD-10-CM

## 2018-02-03 LAB
ALBUMIN SERPL-MCNC: 3.6 GM/DL (ref 3.4–5)
ALP SERPL-CCNC: 96 U/L (ref 45–117)
ALT SERPL-CCNC: 35 U/L (ref 10–53)
AST SERPL-CCNC: 25 U/L (ref 15–37)
BASOPHILS # BLD AUTO: 0 TH/MM3 (ref 0–0.2)
BASOPHILS NFR BLD: 0.9 % (ref 0–2)
BILIRUB SERPL-MCNC: 0.2 MG/DL (ref 0.2–1)
BUN SERPL-MCNC: 17 MG/DL (ref 7–18)
CALCIUM SERPL-MCNC: 8.4 MG/DL (ref 8.5–10.1)
CHLORIDE SERPL-SCNC: 108 MEQ/L (ref 98–107)
COLOR UR: (no result)
CREAT SERPL-MCNC: 0.58 MG/DL (ref 0.5–1)
EOSINOPHIL # BLD: 0.7 TH/MM3 (ref 0–0.4)
EOSINOPHIL NFR BLD: 13.4 % (ref 0–4)
ERYTHROCYTE [DISTWIDTH] IN BLOOD BY AUTOMATED COUNT: 12.5 % (ref 11.6–17.2)
GFR SERPLBLD BASED ON 1.73 SQ M-ARVRAT: 153 ML/MIN (ref 89–?)
GLUCOSE SERPL-MCNC: 86 MG/DL (ref 74–106)
GLUCOSE UR STRIP-MCNC: (no result) MG/DL
HCO3 BLD-SCNC: 29.6 MEQ/L (ref 21–32)
HCT VFR BLD CALC: 35.9 % (ref 35–46)
HGB BLD-MCNC: 12.2 GM/DL (ref 11.6–15.3)
HGB UR QL STRIP: (no result)
KETONES UR STRIP-MCNC: (no result) MG/DL
LEUKOCYTE ESTERASE UR QL STRIP: (no result) /HPF (ref 0–5)
LYMPHOCYTES # BLD AUTO: 1.2 TH/MM3 (ref 1–4.8)
LYMPHOCYTES NFR BLD AUTO: 23.3 % (ref 9–44)
MCH RBC QN AUTO: 29.9 PG (ref 27–34)
MCHC RBC AUTO-ENTMCNC: 33.9 % (ref 32–36)
MCV RBC AUTO: 88 FL (ref 80–100)
MONOCYTE #: 0.8 TH/MM3 (ref 0–0.9)
MONOCYTES NFR BLD: 15.8 % (ref 0–8)
NEUTROPHILS # BLD AUTO: 2.5 TH/MM3 (ref 1.8–7.7)
NEUTROPHILS NFR BLD AUTO: 46.6 % (ref 16–70)
NITRITE UR QL STRIP: (no result)
PLATELET # BLD: 197 TH/MM3 (ref 150–450)
PMV BLD AUTO: 9.4 FL (ref 7–11)
PROT SERPL-MCNC: 8.3 GM/DL (ref 6.4–8.2)
RBC # BLD AUTO: 4.08 MIL/MM3 (ref 4–5.3)
SODIUM SERPL-SCNC: 140 MEQ/L (ref 136–145)
SP GR UR STRIP: 1.02 (ref 1–1.03)
SQUAMOUS #/AREA URNS HPF: (no result) /HPF (ref 0–5)
URINE LEUKOCYTE ESTERASE: (no result)
WBC # BLD AUTO: 5.2 TH/MM3 (ref 4–11)

## 2018-02-03 PROCEDURE — 96375 TX/PRO/DX INJ NEW DRUG ADDON: CPT

## 2018-02-03 PROCEDURE — 96374 THER/PROPH/DIAG INJ IV PUSH: CPT

## 2018-02-03 PROCEDURE — 81001 URINALYSIS AUTO W/SCOPE: CPT

## 2018-02-03 PROCEDURE — 80053 COMPREHEN METABOLIC PANEL: CPT

## 2018-02-03 PROCEDURE — 76705 ECHO EXAM OF ABDOMEN: CPT

## 2018-02-03 PROCEDURE — 85025 COMPLETE CBC W/AUTO DIFF WBC: CPT

## 2018-02-03 PROCEDURE — 99284 EMERGENCY DEPT VISIT MOD MDM: CPT

## 2018-02-03 NOTE — RADRPT
EXAM DATE/TIME:  2018 14:28 

 

[facility] COMPARISON:     

No previous studies available for comparison.

        

 

 

INDICATIONS :     

Right upper quadrant pain. 

                     

 

MEDICAL HISTORY :           

Alcohol use. 

 

SURGICAL HISTORY :     

Tubal ligation.  section.   

 

ENCOUNTER:     

Initial

 

ACUITY:     

2 days

 

PAIN SCORE:     

4/10

 

LOCATION:     

Right upper quadrant 

                     

MEASUREMENTS:     

 

LIVER:     

15.2 cm length 

 

COMMON DUCT:     

5 mm

 

RIGHT KIDNEY:     

10.0 x 3.5 x 4.3  cm

 

FINDINGS:     

 

LIVER:     

Normal echotexture without focal lesion or ductal dilatation.  

 

COMMON DUCT:     

No intraluminal mass or stone visualized.  

 

GALLBLADDER:          

Contains no stones, demonstrates no wall thickening or pericholecystic fluid.  Gallbladder mildly dil
ated.

 

PANCREAS:          

The visualized portions are within normal limits.  

 

RIGHT KIDNEY:          

No evidence of hydronephrosis, stone, or mass.  

 

CONCLUSION:     

1. Gallbladder mildly dilated otherwise unremarkable study. No cholelithiasis.

 

 

 

 Hilario Chappell MD on 2018 at 14:51           

Board Certified Radiologist.

 This report was verified electronically.

## 2018-02-03 NOTE — PD
HPI


Chief Complaint:  Abdominal Pain


Time Seen by Provider:  13:41


Travel History


International Travel<30 days:  No


Contact w/Intl Traveler<30days:  No


Traveled to known affect area:  No





History of Present Illness


HPI


The patient is a 27-year-old  [place] female who presents to the 

emergency department for abdominal pain.  The patient states she developed 

right upper quadrant abdominal pain this morning.  The abdominal pain is 

located right upper quadrant, moderate, sharp, radiates to the back, and is 

associated with nausea and vomiting.  The patient denies any diarrhea, dysuria, 

frequency, urgency, vaginal bleeding, or vaginal discharge.  Previous abdominal 

surgeries include  section and tubal ligation.  The patient denies any 

history of biliary colic.  She denies any chest pain, shortness breath, fever, 

chills, or sweats.





PFSH


Past Medical History


Hx Anticoagulant Therapy:  No


Cardiovascular Problems:  No


Chemotherapy:  No


Cerebrovascular Accident:  No


Diabetes:  No


Diminished Hearing:  No


Respiratory:  Yes (pna as a child)


Immunizations Current:  Yes


Tetanus Vaccination:  > 5 Years


Pregnant?:  Not Pregnant


LMP:  "last month"


:  6


Para:  5


Miscarriage:  0


:  1


Tubal Ligation:  Yes





Past Surgical History


 Section:  Yes (x3)





Social History


Alcohol Use:  Yes (occ)


Tobacco Use:  No


Substance Use:  No





Allergies-Medications


(Allergen,Severity, Reaction):  


Coded Allergies:  


     No Known Allergies (Verified  Adverse Reaction, Unknown, 17)


Reported Meds & Prescriptions





Reported Meds & Active Scripts


Active


Miralax Powder (Polyethylene Glycol 3350 Powder) 17 Gm Powd 17 Gm PO DAILY


     Mix and dissolve one measuring cap-ful (17 grams) in water or juice.


Tucks Medicated Topical (Witch Hazel Topical) 50 % Pad 1 Pad TOPICAL Q4H PRN


Anusol-Hc Supp (Hydrocortisone Supp) 25 Mg Supp 25 Mg RECTAL BID


Reported


Prenatal Plus Iron 29-1 mg (Prenatal Vit-Iron Carbonyl) 29 Mg Iron-1 Mg Tab 1 

Tab PO DAILY








Review of Systems


Except as stated in HPI:  all other systems reviewed are Neg


General / Constitutional:  No: Fever


Cardiovascular:  No: Chest Pain or Discomfort


Respiratory:  No: Shortness of Breath


Gastrointestinal:  Positive: Nausea, Vomiting, Abdominal Pain, No: Diarrhea


Genitourinary:  No: Dysuria, Discharge, Vaginal Bleeding





Physical Exam


Narrative


GENERAL: Awake, alert, pleasant 27-year-old female who appears her stated age 

and is in no acute respiratory distress.


SKIN: Focused skin assessment warm/dry.


HEAD: Atraumatic. Normocephalic. 


EYES: Pupils equal and round. No scleral icterus. No injection or drainage. 


ENT: No nasal bleeding or discharge.  Mucous membranes pink and moist.


NECK: Trachea midline. No JVD. 


CARDIOVASCULAR: Regular rate and rhythm.  No murmur appreciated.


RESPIRATORY: No accessory muscle use. Clear to auscultation. Breath sounds 

equal bilaterally. 


GASTROINTESTINAL: Abdomen soft, tender to palpation right upper quadrant.  

Positive Srinivasan.


Back: No CVA tenderness.


MUSCULOSKELETAL: No obvious deformities. No clubbing.  No cyanosis.  No edema. 


NEUROLOGICAL: Awake and alert. No obvious cranial nerve deficits.  Motor 

grossly within normal limits. Normal speech.


PSYCHIATRIC: Appropriate mood and affect; insight and judgment normal.





Data


Data


Last Documented VS





Vital Signs








  Date Time  Temp Pulse Resp B/P (MAP) Pulse Ox O2 Delivery O2 Flow Rate FiO2


 


2/3/18 13:40 98.1 82 18 107/67 (80) 98   








Orders





 Orders


Complete Blood Count With Diff (2/3/18 14:00)


Comprehensive Metabolic Panel (2/3/18 14:00)


Urinalysis - C+S If Indicated (2/3/18 14:00)


Ondansetron Inj (Zofran Inj) (2/3/18 14:45)


Ketorolac Inj (Toradol Inj) (2/3/18 14:45)


Morphine Inj (Morphine Inj) (2/3/18 14:45)


Us Abdomen Gallbladder (2/3/18 )





Labs





Laboratory Tests








Test


  2/3/18


14:00


 


White Blood Count 5.2 TH/MM3 


 


Red Blood Count 4.08 MIL/MM3 


 


Hemoglobin 12.2 GM/DL 


 


Hematocrit 35.9 % 


 


Mean Corpuscular Volume 88.0 FL 


 


Mean Corpuscular Hemoglobin 29.9 PG 


 


Mean Corpuscular Hemoglobin


Concent 33.9 % 


 


 


Red Cell Distribution Width 12.5 % 


 


Platelet Count 197 TH/MM3 


 


Mean Platelet Volume 9.4 FL 


 


Neutrophils (%) (Auto) 46.6 % 


 


Lymphocytes (%) (Auto) 23.3 % 


 


Monocytes (%) (Auto) 15.8 % 


 


Eosinophils (%) (Auto) 13.4 % 


 


Basophils (%) (Auto) 0.9 % 


 


Neutrophils # (Auto) 2.5 TH/MM3 


 


Lymphocytes # (Auto) 1.2 TH/MM3 


 


Monocytes # (Auto) 0.8 TH/MM3 


 


Eosinophils # (Auto) 0.7 TH/MM3 


 


Basophils # (Auto) 0.0 TH/MM3 


 


CBC Comment DIFF FINAL 


 


Differential Comment  


 


Urine Collection Type CLEAN CATCH 


 


Urine Color STRAW 


 


Urine Turbidity CLEAR 


 


Urine pH 7.0 


 


Urine Specific Gravity 1.023 


 


Urine Protein NEG mg/dL 


 


Urine Glucose (UA) NEG mg/dL 


 


Urine Ketones NEG mg/dL 


 


Urine Occult Blood NEG 


 


Urine Nitrite NEG 


 


Urine Bilirubin NEG 


 


Urine Leukocyte Esterase NEG 


 


Urine WBC 0-2 /hpf 


 


Urine Squamous Epithelial


Cells 0-5 /hpf 


 


 


Microscopic Urinalysis Comment


  CULT NOT


INDICATED


 


Blood Urea Nitrogen 17 MG/DL 


 


Creatinine 0.58 MG/DL 


 


Random Glucose 86 MG/DL 


 


Total Protein 8.3 GM/DL 


 


Albumin 3.6 GM/DL 


 


Calcium Level 8.4 MG/DL 


 


Alkaline Phosphatase 96 U/L 


 


Aspartate Amino Transf


(AST/SGOT) 25 U/L 


 


 


Alanine Aminotransferase


(ALT/SGPT) 35 U/L 


 


 


Total Bilirubin 0.2 MG/DL 


 


Sodium Level 140 MEQ/L 


 


Potassium Level 4.2 MEQ/L 


 


Chloride Level 108 MEQ/L 


 


Carbon Dioxide Level 29.6 MEQ/L 


 


Anion Gap 2 MEQ/L 


 


Estimat Glomerular Filtration


Rate 153 ML/MIN 


 











[facility]


Medical Decision Making


Medical Screen Exam Complete:  Yes


Emergency Medical Condition:  Yes


Medical Record Reviewed:  Yes


Interpretation(s)





Laboratory Tests








Test


  2/3/18


14:00


 


White Blood Count 5.2 TH/MM3 


 


Red Blood Count 4.08 MIL/MM3 


 


Hemoglobin 12.2 GM/DL 


 


Hematocrit 35.9 % 


 


Mean Corpuscular Volume 88.0 FL 


 


Mean Corpuscular Hemoglobin 29.9 PG 


 


Mean Corpuscular Hemoglobin


Concent 33.9 % 


 


 


Red Cell Distribution Width 12.5 % 


 


Platelet Count 197 TH/MM3 


 


Mean Platelet Volume 9.4 FL 


 


Neutrophils (%) (Auto) 46.6 % 


 


Lymphocytes (%) (Auto) 23.3 % 


 


Monocytes (%) (Auto) 15.8 % 


 


Eosinophils (%) (Auto) 13.4 % 


 


Basophils (%) (Auto) 0.9 % 


 


Neutrophils # (Auto) 2.5 TH/MM3 


 


Lymphocytes # (Auto) 1.2 TH/MM3 


 


Monocytes # (Auto) 0.8 TH/MM3 


 


Eosinophils # (Auto) 0.7 TH/MM3 


 


Basophils # (Auto) 0.0 TH/MM3 


 


CBC Comment DIFF FINAL 


 


Differential Comment  


 


Urine Collection Type CLEAN CATCH 


 


Urine Color STRAW 


 


Urine Turbidity CLEAR 


 


Urine pH 7.0 


 


Urine Specific Gravity 1.023 


 


Urine Protein NEG mg/dL 


 


Urine Glucose (UA) NEG mg/dL 


 


Urine Ketones NEG mg/dL 


 


Urine Occult Blood NEG 


 


Urine Nitrite NEG 


 


Urine Bilirubin NEG 


 


Urine Leukocyte Esterase NEG 


 


Urine WBC 0-2 /hpf 


 


Urine Squamous Epithelial


Cells 0-5 /hpf 


 


 


Microscopic Urinalysis Comment


  CULT NOT


INDICATED


 


Blood Urea Nitrogen 17 MG/DL 


 


Creatinine 0.58 MG/DL 


 


Random Glucose 86 MG/DL 


 


Total Protein 8.3 GM/DL 


 


Albumin 3.6 GM/DL 


 


Calcium Level 8.4 MG/DL 


 


Alkaline Phosphatase 96 U/L 


 


Aspartate Amino Transf


(AST/SGOT) 25 U/L 


 


 


Alanine Aminotransferase


(ALT/SGPT) 35 U/L 


 


 


Total Bilirubin 0.2 MG/DL 


 


Sodium Level 140 MEQ/L 


 


Potassium Level 4.2 MEQ/L 


 


Chloride Level 108 MEQ/L 


 


Carbon Dioxide Level 29.6 MEQ/L 


 


Anion Gap 2 MEQ/L 


 


Estimat Glomerular Filtration


Rate 153 ML/MIN 


 








Last Impressions








Gall Bladder Ultrasound 2/3/18 0000 Signed





Impressions: 





 Service Date/Time:  Saturday, February 3, 2018 14:28 - CONCLUSION:  1. 





 Gallbladder mildly dilated otherwise unremarkable study. No cholelithiasis.   

  





 Hilario Chappell MD 








Differential Diagnosis


Differential diagnosis includes cholecystitis, biliary colic, 

choledocholithiasis, symptomatic gallstone, pancreatitis, peptic ulcer disease, 

pyelonephritis, lower lobe pneumonia.


Narrative Course


IV was established, labs are drawn and sent, and the patient was placed on 

cardiac telemetry monitoring and continuous pulse oximetry monitoring.  The 

patient was administered morphine, Zofran, Toradol, and IV fluids.  Bedside UA 

pregnancy test was obtained and UA was sent to lab.  Ultrasound of the 

gallbladder was obtained.  White count is normal.  LFTs are unremarkable.  UA 

is negative.  The patient's ultrasound gallbladder reveals is mildly dilated 

but no pericholecystic fluid, gallbladder wall thickening, or gallstones.  The 

patient was reevaluated at 3:05 PM, her pain had resolved.  Patient may have 

biliary colic versus transient gallbladder pain not otherwise specified.  The 

patient will be discharged home with pain medications and a copy of her 

ultrasound results and lab results.  She is advised to follow-up with a primary 

physician.





Diagnosis





 Primary Impression:  


 Abdominal pain


 Qualified Codes:  R10.11 - Right upper quadrant pain


Patient Instructions:  General Instructions





***Additional Instructions:  


Pain medications as directed.  Please provide the patient a copy of her 

ultrasound results and lab results at discharge.  Follow-up with a primary 

physician.  Clear liquid diet and advance as tolerated.  Return if symptoms 

worsen or progress.


***Med/Other Pt SpecificInfo:  Prescription(s) given


Scripts


Ondansetron Odt (Zofran Odt) 4 Mg Tab


4 MG SL Q6HR Y for Nausea/Vomiting, #7 TAB 0 Refills


   Prov: Hugo Sainz MD         2/3/18 


Hydrocodone-Acetaminophen (Norco) 5 Mg-325 Mg Tab


1 TAB PO Q6H Y for PAIN, #12 TAB 0 Refills


   Prov: Hugo Sainz MD         2/3/18


Disposition:  01 DISCHARGE HOME


Condition:  Stable











Hugo Sainz MD Feb 3, 2018 14:17

## 2018-02-26 ENCOUNTER — HOSPITAL ENCOUNTER (OUTPATIENT)
Dept: HOSPITAL 17 - NEPD | Age: 28
Setting detail: OBSERVATION
LOS: 1 days | Discharge: HOME | End: 2018-02-27
Attending: HOSPITALIST | Admitting: HOSPITALIST
Payer: MEDICAID

## 2018-02-26 VITALS
RESPIRATION RATE: 14 BRPM | OXYGEN SATURATION: 100 % | HEART RATE: 68 BPM | SYSTOLIC BLOOD PRESSURE: 118 MMHG | DIASTOLIC BLOOD PRESSURE: 76 MMHG | TEMPERATURE: 98.8 F

## 2018-02-26 DIAGNOSIS — N61.1: Primary | ICD-10-CM

## 2018-02-26 LAB
BASOPHILS # BLD AUTO: 0.1 TH/MM3 (ref 0–0.2)
BASOPHILS NFR BLD: 0.7 % (ref 0–2)
BUN SERPL-MCNC: 19 MG/DL (ref 7–18)
CALCIUM SERPL-MCNC: 9.1 MG/DL (ref 8.5–10.1)
CHLORIDE SERPL-SCNC: 104 MEQ/L (ref 98–107)
CREAT SERPL-MCNC: 0.74 MG/DL (ref 0.5–1)
EOSINOPHIL # BLD: 0.7 TH/MM3 (ref 0–0.4)
EOSINOPHIL NFR BLD: 8.6 % (ref 0–4)
ERYTHROCYTE [DISTWIDTH] IN BLOOD BY AUTOMATED COUNT: 14.1 % (ref 11.6–17.2)
GFR SERPLBLD BASED ON 1.73 SQ M-ARVRAT: 116 ML/MIN (ref 89–?)
GLUCOSE SERPL-MCNC: 93 MG/DL (ref 74–106)
HCO3 BLD-SCNC: 30.6 MEQ/L (ref 21–32)
HCT VFR BLD CALC: 38.1 % (ref 35–46)
HGB BLD-MCNC: 12.6 GM/DL (ref 11.6–15.3)
LYMPHOCYTES # BLD AUTO: 1.8 TH/MM3 (ref 1–4.8)
LYMPHOCYTES NFR BLD AUTO: 23 % (ref 9–44)
MCH RBC QN AUTO: 29.1 PG (ref 27–34)
MCHC RBC AUTO-ENTMCNC: 32.9 % (ref 32–36)
MCV RBC AUTO: 88.3 FL (ref 80–100)
MONOCYTE #: 0.5 TH/MM3 (ref 0–0.9)
MONOCYTES NFR BLD: 6.8 % (ref 0–8)
NEUTROPHILS # BLD AUTO: 4.7 TH/MM3 (ref 1.8–7.7)
NEUTROPHILS NFR BLD AUTO: 60.9 % (ref 16–70)
PLATELET # BLD: 215 TH/MM3 (ref 150–450)
PMV BLD AUTO: 9.6 FL (ref 7–11)
RBC # BLD AUTO: 4.32 MIL/MM3 (ref 4–5.3)
SODIUM SERPL-SCNC: 138 MEQ/L (ref 136–145)
WBC # BLD AUTO: 7.7 TH/MM3 (ref 4–11)

## 2018-02-26 PROCEDURE — 87186 SC STD MICRODIL/AGAR DIL: CPT

## 2018-02-26 PROCEDURE — 96365 THER/PROPH/DIAG IV INF INIT: CPT

## 2018-02-26 PROCEDURE — 80048 BASIC METABOLIC PNL TOTAL CA: CPT

## 2018-02-26 PROCEDURE — 85610 PROTHROMBIN TIME: CPT

## 2018-02-26 PROCEDURE — G0378 HOSPITAL OBSERVATION PER HR: HCPCS

## 2018-02-26 PROCEDURE — 76642 ULTRASOUND BREAST LIMITED: CPT

## 2018-02-26 PROCEDURE — 96361 HYDRATE IV INFUSION ADD-ON: CPT

## 2018-02-26 PROCEDURE — 87040 BLOOD CULTURE FOR BACTERIA: CPT

## 2018-02-26 PROCEDURE — 86403 PARTICLE AGGLUT ANTBDY SCRN: CPT

## 2018-02-26 PROCEDURE — 85730 THROMBOPLASTIN TIME PARTIAL: CPT

## 2018-02-26 PROCEDURE — 76942 ECHO GUIDE FOR BIOPSY: CPT

## 2018-02-26 PROCEDURE — 96376 TX/PRO/DX INJ SAME DRUG ADON: CPT

## 2018-02-26 PROCEDURE — 96375 TX/PRO/DX INJ NEW DRUG ADDON: CPT

## 2018-02-26 PROCEDURE — 99285 EMERGENCY DEPT VISIT HI MDM: CPT

## 2018-02-26 PROCEDURE — 19000 PUNCTURE ASPIR CYST BREAST: CPT

## 2018-02-26 PROCEDURE — 87070 CULTURE OTHR SPECIMN AEROBIC: CPT

## 2018-02-26 PROCEDURE — 87205 SMEAR GRAM STAIN: CPT

## 2018-02-26 PROCEDURE — 85025 COMPLETE CBC W/AUTO DIFF WBC: CPT

## 2018-02-26 NOTE — PD
HPI


Chief Complaint:  Pain: Acute or Chronic


Time Seen by Provider:  22:25


Travel History


International Travel<30 days:  No


Contact w/Intl Traveler<30days:  No


Traveled to known affect area:  No





History of Present Illness


HPI


Patient is a 27-year-old female presenting to the emergency department 

evaluation of right breast pain.  Patient states started 2 weeks ago, worsening 

today.  Patient rates her pain a 10 out of 10, she is no longer breast-feeding.

  She reports feeling edema to the right lower quadrant.  She denies any fever, 

chills, nausea, vomiting, chest pain or shortness of breath.  Patient states 

that she put her nipple ring back in in December.  Symptom severity is moderate

, onset was gradual, there are no alleviating factors.  Pain is exacerbated to 

touch with movement.  Patient reported there was some drainage that is white.  

She denies any foul odor.





PFSH


Past Medical History


Medical History:  Denies Significant Hx


Respiratory:  Yes (pna as a child)


Immunizations Current:  Yes


Tetanus Vaccination:  Unknown


Pregnant?:  Not Pregnant


:  6


Para:  5


Miscarriage:  0


:  1


Tubal Ligation:  Yes





Past Surgical History


 Section:  Yes (x3)





Social History


Alcohol Use:  Yes (occ)


Tobacco Use:  No


Substance Use:  No





Allergies-Medications


(Allergen,Severity, Reaction):  


Coded Allergies:  


     No Known Allergies (Verified  Adverse Reaction, Unknown, 17)


Reported Meds & Prescriptions





Reported Meds & Active Scripts


Active


No Active Prescriptions or Reported Medications    








Review of Systems


Except as stated in HPI:  all other systems reviewed are Neg


Skin:  Positive Breast Lumps, Positive Breast Tenderness, Positive Other (

Drainage from nipple ring)





Physical Exam


Narrative


GENERAL: Well-developed, well-nourished, alert -American female.  

Presenting in no acute distress.


SKIN: Warm and dry.  Tenderness to palpation to right breast in the right lower 

quadrant between the 6:00 and 9 o'clock position.  There is milky thick 

drainage from the nipple piercing in the right breast.  No erythema or warmth 

noted.


HEAD: Atraumatic. Normocephalic. 


EYES: Pupils equal and round. No scleral icterus. No injection or drainage. 


ENT: No nasal bleeding or discharge.  Mucous membranes pink and moist.


NECK: Trachea midline. No JVD. 


CARDIOVASCULAR: Regular rate and rhythm.  


RESPIRATORY: No accessory muscle use. Clear to auscultation. Breath sounds 

equal bilaterally. 


GASTROINTESTINAL: Abdomen soft, non-tender, nondistended. Hepatic and splenic 

margins not palpable. 


MUSCULOSKELETAL: Extremities without clubbing, cyanosis, or edema. No obvious 

deformities. 


NEUROLOGICAL: Awake and alert. No obvious cranial nerve deficits.  Motor 

grossly within normal limits. Five out of 5 muscle strength in the arms and 

legs.  Normal speech.


PSYCHIATRIC: Appropriate mood and affect; insight and judgment normal.





Data


Data


Last Documented VS





Vital Signs








  Date Time  Temp Pulse Resp B/P (MAP) Pulse Ox O2 Delivery O2 Flow Rate FiO2


 


18 21:00 98.8 68 14 118/76 (90) 100   








Orders





 Orders


Us Breast Unilateral (18 )


Oxycodone-Acetamin 5-325 Mg (Percocet (18 22:30)


Complete Blood Count With Diff (18 22:30)


Basic Metabolic Panel (Bmp) (18 22:30)


Wound Culture And Gram Stain (18 22:30)


Vancomycin Inj (Vancomycin Inj) (18 00:00)


Iv Access Insert/Monitor (18 23:50)


Blood Culture (18 23:54)


Admit Order (Ed Use Only) (18 23:54)





Labs





Laboratory Tests








Test


  18


22:45


 


White Blood Count 7.7 TH/MM3 


 


Red Blood Count 4.32 MIL/MM3 


 


Hemoglobin 12.6 GM/DL 


 


Hematocrit 38.1 % 


 


Mean Corpuscular Volume 88.3 FL 


 


Mean Corpuscular Hemoglobin 29.1 PG 


 


Mean Corpuscular Hemoglobin


Concent 32.9 % 


 


 


Red Cell Distribution Width 14.1 % 


 


Platelet Count 215 TH/MM3 


 


Mean Platelet Volume 9.6 FL 


 


Neutrophils (%) (Auto) 60.9 % 


 


Lymphocytes (%) (Auto) 23.0 % 


 


Monocytes (%) (Auto) 6.8 % 


 


Eosinophils (%) (Auto) 8.6 % 


 


Basophils (%) (Auto) 0.7 % 


 


Neutrophils # (Auto) 4.7 TH/MM3 


 


Lymphocytes # (Auto) 1.8 TH/MM3 


 


Monocytes # (Auto) 0.5 TH/MM3 


 


Eosinophils # (Auto) 0.7 TH/MM3 


 


Basophils # (Auto) 0.1 TH/MM3 


 


CBC Comment DIFF FINAL 


 


Differential Comment  


 


Blood Urea Nitrogen 19 MG/DL 


 


Creatinine 0.74 MG/DL 


 


Random Glucose 93 MG/DL 


 


Calcium Level 9.1 MG/DL 


 


Sodium Level 138 MEQ/L 


 


Potassium Level 3.7 MEQ/L 


 


Chloride Level 104 MEQ/L 


 


Carbon Dioxide Level 30.6 MEQ/L 


 


Anion Gap 3 MEQ/L 


 


Estimat Glomerular Filtration


Rate 116 ML/MIN 


 











[facility]


Medical Decision Making


Medical Screen Exam Complete:  Yes


Emergency Medical Condition:  Yes


Interpretation(s)





Laboratory Tests








Test


  18


22:45


 


White Blood Count 7.7 TH/MM3 


 


Red Blood Count 4.32 MIL/MM3 


 


Hemoglobin 12.6 GM/DL 


 


Hematocrit 38.1 % 


 


Mean Corpuscular Volume 88.3 FL 


 


Mean Corpuscular Hemoglobin 29.1 PG 


 


Mean Corpuscular Hemoglobin


Concent 32.9 % 


 


 


Red Cell Distribution Width 14.1 % 


 


Platelet Count 215 TH/MM3 


 


Mean Platelet Volume 9.6 FL 


 


Neutrophils (%) (Auto) 60.9 % 


 


Lymphocytes (%) (Auto) 23.0 % 


 


Monocytes (%) (Auto) 6.8 % 


 


Eosinophils (%) (Auto) 8.6 % 


 


Basophils (%) (Auto) 0.7 % 


 


Neutrophils # (Auto) 4.7 TH/MM3 


 


Lymphocytes # (Auto) 1.8 TH/MM3 


 


Monocytes # (Auto) 0.5 TH/MM3 


 


Eosinophils # (Auto) 0.7 TH/MM3 


 


Basophils # (Auto) 0.1 TH/MM3 


 


CBC Comment DIFF FINAL 


 


Differential Comment  


 


Blood Urea Nitrogen 19 MG/DL 


 


Creatinine 0.74 MG/DL 


 


Random Glucose 93 MG/DL 


 


Calcium Level 9.1 MG/DL 


 


Sodium Level 138 MEQ/L 


 


Potassium Level 3.7 MEQ/L 


 


Chloride Level 104 MEQ/L 


 


Carbon Dioxide Level 30.6 MEQ/L 


 


Anion Gap 3 MEQ/L 


 


Estimat Glomerular Filtration


Rate 116 ML/MIN 


 








Vital Signs








  Date Time  Temp Pulse Resp B/P (MAP) Pulse Ox O2 Delivery O2 Flow Rate FiO2


 


18 21:00 98.8 68 14 118/76 (90) 100   








Differential Diagnosis


Mastitis versus abscess versus fibrocystic breasts versus other


Narrative Course


Patient is a 27-year-old female that presented for evaluation of right breast 

pain.  Patient's vital signs are stable, she is afebrile.  On exam there is 

significant tenderness to the right lower quadrant of the right breast, there 

was also discharge from the nipple ring noted.  CBC, metabolic panel, 

ultrasound of the breast ordered and pending.  Patient was given Percocet for 

pain.  Ultrasound shows a multilobular likely breast abscess at the 7 o'clock 

position as well as mastitis.  Blood cultures were ordered, vancomycin 1 g IV 

ordered.  Discussed findings with my attending physician as well as Dr. Winn 

who accepted admit.  Patient was made aware of all findings.,  She is agreeable 

to stay.  Admit orders placed.





Diagnosis





 Primary Impression:  


 Mastitis


 Additional Impression:  


 Breast abscess





Admitting Information


Admitting Physician Requests:  Admit


Scripts


No Active Prescriptions or Reported Meds


Condition:  Stable











Suzan Bush [facility] 2018 22:45

## 2018-02-26 NOTE — RADRPT
EXAM DATE/TIME:  2018 23:19 

 

[facility] COMPARISON:     

No previous studies available for comparison.

        

 

 

INDICATIONS :     

Abscess.

                     

 

MEDICAL HISTORY :           

Alcohol use.

 

SURGICAL HISTORY :     

Tubal ligation.  section.   

 

ENCOUNTER:     

Initial

 

ACUITY:     

1 week

 

PAIN SCORE:     

6/10

 

LOCATION:     

Right   breast. 

                     

 

 

FINDINGS:     

 

Fibrocystic appearance of the breast with heterogeneous multiloculated collection at 7:

00 position approximately 3 cm from the nipple with increased peripheral vascularity measuring 3.2 x 
1.9 x 0.6 cm.

 

 

CONCLUSION:     

 

1. Findings consistent with mastitis and probable 3.2 cm abscess in the 7:

2. 00 position approximately 3 cm from the nipple, as above.

 

 

 

 Shorty Capps MD on 2018 at 23:38           

Board Certified Radiologist.

 This report was verified electronically.

## 2018-02-27 VITALS
RESPIRATION RATE: 16 BRPM | DIASTOLIC BLOOD PRESSURE: 56 MMHG | HEART RATE: 61 BPM | OXYGEN SATURATION: 99 % | TEMPERATURE: 98 F | SYSTOLIC BLOOD PRESSURE: 100 MMHG

## 2018-02-27 VITALS
HEART RATE: 100 BPM | SYSTOLIC BLOOD PRESSURE: 121 MMHG | RESPIRATION RATE: 18 BRPM | DIASTOLIC BLOOD PRESSURE: 69 MMHG | OXYGEN SATURATION: 98 % | TEMPERATURE: 97.8 F

## 2018-02-27 VITALS
HEART RATE: 76 BPM | RESPIRATION RATE: 16 BRPM | SYSTOLIC BLOOD PRESSURE: 107 MMHG | OXYGEN SATURATION: 94 % | DIASTOLIC BLOOD PRESSURE: 69 MMHG | TEMPERATURE: 98.3 F

## 2018-02-27 VITALS
HEART RATE: 92 BPM | DIASTOLIC BLOOD PRESSURE: 60 MMHG | OXYGEN SATURATION: 92 % | SYSTOLIC BLOOD PRESSURE: 97 MMHG | TEMPERATURE: 98.3 F | RESPIRATION RATE: 14 BRPM

## 2018-02-27 VITALS
OXYGEN SATURATION: 96 % | TEMPERATURE: 98.2 F | HEART RATE: 83 BPM | RESPIRATION RATE: 18 BRPM | DIASTOLIC BLOOD PRESSURE: 57 MMHG | SYSTOLIC BLOOD PRESSURE: 103 MMHG

## 2018-02-27 LAB
INR PPP: 1 RATIO
PROTHROMBIN TIME: 10.3 SEC (ref 9.8–11.6)

## 2018-02-27 RX ADMIN — TAZOBACTAM SODIUM AND PIPERACILLIN SODIUM SCH MLS/HR: 375; 3 INJECTION, SOLUTION INTRAVENOUS at 02:59

## 2018-02-27 RX ADMIN — PHENYTOIN SODIUM SCH MLS/HR: 50 INJECTION INTRAMUSCULAR; INTRAVENOUS at 02:07

## 2018-02-27 RX ADMIN — PHENYTOIN SODIUM SCH MLS/HR: 50 INJECTION INTRAMUSCULAR; INTRAVENOUS at 12:46

## 2018-02-27 RX ADMIN — TAZOBACTAM SODIUM AND PIPERACILLIN SODIUM SCH MLS/HR: 375; 3 INJECTION, SOLUTION INTRAVENOUS at 13:00

## 2018-02-27 RX ADMIN — TAZOBACTAM SODIUM AND PIPERACILLIN SODIUM SCH MLS/HR: 375; 3 INJECTION, SOLUTION INTRAVENOUS at 06:15

## 2018-02-27 NOTE — RADRPT
EXAM DATE/TIME:  2018 09:05 

 

[facility] COMPARISON:     

No previous studies available for comparison.

 

 

INDICATIONS :      

Right breast abscess.

                 

 

MEDICAL HISTORY :     

Pregnancy.   

 

SURGICAL HISTORY :     

 section. Tubal ligation.  

 

ENCOUNTER:     

Initial

 

ACUITY:     

4 - 6 months

 

PAIN SCORE:     

8/10

 

LOCATION:     

Right Breast.

                 

 

FLUID:

Total volume of 4 cc of cloudy, chalky fluid was removed.

Fluid was sent to lab for ordered studies. 

Post procedure scanning reveals no hematoma or other complication.

 

 

TECHNIQUE:  

1. Ultrasound guidance for needle aspiration.

2. Aspiration.

 

The risks, benefits and alternatives to the procedure were explained and verbal and written consent w
as obtained.  The site was prepped in sterile fashion.  Full sterile technique was used, including ca
p, mask, sterile gloves and gown and a large sterile sheet.  Hand hygiene and 2% chlorhexidine and/or
 betadine/alcohol prep was utilized per protocol for cutaneous antisepsis.  The skin and subcutaneous
 tissues were infiltrated with local anesthetic solution.  Sterile gel and sterile probe cover were u
tilized for ultrasound guidance.

 

With the patient on the ultrasound table, ultrasound imaging was used to select the most appropriate 
approach for aspiration.  A dermatotomy was made with an 11 blade scalpel.  A catheter was introduced
 into the cavity and fluid was collected.   

 

CONCLUSION:     

Uncomplicated ultrasound guided aspiration of right breast ducts.  

 

 

 

 Hilario Chappell MD on 2018 at 15:39           

Board Certified Radiologist.

 This report was verified electronically.

## 2018-02-27 NOTE — HHI.PR
Subjective


Remarks


Follow up visit mastitis.  Patient seen and examined today.  Reports she is 

doing well.  States that the lump on her right breast is already gone because 

he was aspirated using a needle.  Patient states that they've collected and 

sent for cultures.  Complains of mild itching with use of IV antibiotics.  

Otherwise, denies fevers, chills, nausea, vomiting, diarrhea.  Eyes and 

shortness of breath or dyspnea.  Denies any pain or discomfort.  Denies any 

dysuria.





Patient states that she has 2 twin 6-month-old babies and that she wanted to go 

home.  One further interviewed patient states that she continues to lactate but 

not breast-feeding.  Patient also states that when she had her other son her 

left breast also had mastitis during that time and they aspirated and sent her 

home with antibiotics.





Objective


Vitals





Vital Signs








  Date Time  Temp Pulse Resp B/P (MAP) Pulse Ox O2 Delivery O2 Flow Rate FiO2


 


2/27/18 11:58 97.8 100 18 121/69 (86) 98   


 


2/27/18 09:40 98.3 76 16 107/69 (82) 94   


 


2/27/18 09:25 98.3 92 14 97/60 (72) 92   


 


2/27/18 07:39 98.2 83 18 103/57 (72) 96   


 


2/27/18 04:48 98.0 61 16 100/56 (71) 99   


 


2/26/18 21:00 98.8 68 14 118/76 (90) 100   














I/O      


 


 2/26/18 2/26/18 2/26/18 2/27/18 2/27/18 2/27/18





 07:00 15:00 23:00 07:00 15:00 23:00


 


Intake Total    250 ml 240 ml 


 


Balance    250 ml 240 ml 


 


      


 


Intake Oral     240 ml 


 


IV Total    250 ml  








Result Diagram:  


2/26/18 2245 2/26/18 2245





Imaging





Last Impressions








Breast Ultrasound 2/26/18 0000 Signed





Impressions: 





 Service Date/Time:  Monday, February 26, 2018 23:19 - CONCLUSION:   1. 

Findings 





 consistent with mastitis and probable 3.2 cm abscess in the 7: 2. 00 position 





 approximately 3 cm from the nipple, as above.     Shorty Capps MD 








Objective Remarks


GENERAL: This is a well-nourished, well-developed patient, in no apparent 

distress.


SKIN: Warm and dry


HEENT: Normocephalic. Pupils equal round and reactive. Nose without bleeding. 

Airway patent.


NECK: Trachea midline. 


BREAST: Right Breast nontender to palpate, no lumps, mass, or lesion noted, 

Bandage in place s/p US guided aspiration.  Right nipple area with nipple ring.


CARDIOVASCULAR: Regular rate and rhythm without murmurs, gallops, or rubs. 


RESPIRATORY: Clear to auscultation. Breath sounds equal bilaterally. No wheezes

, rales, or rhonchi.  


GASTROINTESTINAL: Abdomen soft, non-tender, nondistended. Bowel Sounds 

normoactive x4.


MUSCULOSKELETAL: Extremities without clubbing, cyanosis, or edema.


NEUROLOGICAL: Awake and alert. No focal neuro deficit.  Moves all extremities. 

Normal speech.


Procedures


US guided right breast aspiration of abscess





A/P


Problem List:  


(1) Breast abscess


ICD Code:  N61.1 - Abscess of the breast and nipple


Status:  Acute


(2) Mastitis


ICD Code:  N61.0 - Mastitis without abscess


Status:  Acute


Assessment and Plan


Patient is a 27-year-old female with no significant past medical history 

presents to the emergency department for evaluation of right breast pain.





Mastitis/breast abscess


Lactating


   - Ultrasound of the right breast significant for mastitis and probable 

abscess of the right breast


   - Vancomycin/Zosyn IV


   - General surgery consulted to evaluate for possible I&D, recommended 

ultrasound aspiration of the abscess.  Ultrasound aspiration has been done 

cultures been sent.


   - Patient significantly improved.  No fevers noted.  White count within 

normal yesterday.  States mastitis significantly improved.


   - Plan to DC home with cefuroxime 500 mg twice a day 


   - Pending fluid cultures, adamant to go home as she needs her son to 

evaluated in ED.  Discussed and explained if cultures are different and not 

covered by broad-spectrum antibiotic she might need to change her antibiotic she

'll get a full call from our nurse





DVT prop ambulatory





Discharge patient to home


Condition on discharge: Improved


Regular Diet as tolerated


Ad Mel activity


Rx written: Cefuroxime 500 mg BID 10days


Follow-up with primary care physician


Discharge Planning


Plan to DC home today











Matt Perez Feb 27, 2018 12:12

## 2018-02-27 NOTE — HHI.HP
__________________________________________________





[facility]


Service


[facility]


Primary Care Physician


No Primary Care Physician


Admission Diagnosis





BREAST ABSCESS/MASTITIS


Diagnoses:  


Travel History


International Travel<30 Days:  No


Contact w/Intl Traveler <30 Da:  No


Traveled to Known Affected Are:  No


History of Present Illness


27-year-old female with no significant past medical history presents to the 

emergency department for evaluation of right breast pain.  The patient reports 

that the pain started approximately 2 weeks ago and has steadily worsened.  She 

states it is worse on the right side of her breast.  She denies any swelling of 

the right breast.  Denies any palpable masses.  Denies nipple discharge.  

Endorses subjective chills.  No fevers.  Denies chest pain or shortness of 

breath.  No nausea/vomiting/diarrhea.





Review of Systems


Except as stated in HPI:  all other systems reviewed are Neg





Past Family Social History


Past Medical History


None


Past Surgical History


BTL


 3


Reported Medications





Reported Meds & Active Scripts


Active


No Active Prescriptions or Reported Medications


Allergies:  


Coded Allergies:  


     No Known Allergies (Verified  Adverse Reaction, Unknown, 17)


Family History


Negative for CAD/DM


Social History


Denies alcohol, tobacco or drugs





Physical Exam


Vital Signs





Vital Signs








  Date Time  Temp Pulse Resp B/P (MAP) Pulse Ox O2 Delivery O2 Flow Rate FiO2


 


18 21:00 98.8 68 14 118/76 (90) 100   








Physical Exam


GENERAL:  female lying in bed


SKIN: No rashes, ecchymoses or lesions. Cool and dry.


HEAD: Atraumatic. Normocephalic. No temporal or scalp tenderness.


EYES: Pupils equal round and reactive. Extraocular motions intact. No scleral 

icterus. No injection or drainage. 


ENT: Nose without bleeding, purulent drainage or septal hematoma. Throat 

without erythema, tonsillar hypertrophy or exudate. Uvula midline. Airway 

patent.


NECK: Trachea midline. No JVD or lymphadenopathy. Supple, nontender, no 

meningeal signs.


CARDIOVASCULAR: Regular rate and rhythm without murmurs, gallops, or rubs. 


BREASTS: Right breast exquisitely tender to palpation on the lateral aspect of 

the breast.  No palpable masses.  No nipple discharge.


RESPIRATORY: Clear to auscultation. Breath sounds equal bilaterally. No wheezes

, rales, or rhonchi.  


GASTROINTESTINAL: Abdomen soft, non-tender, nondistended. No hepato-splenomegaly

, or palpable masses. No guarding.


MUSCULOSKELETAL: Extremities without clubbing, cyanosis, or edema. No joint 

tenderness, effusion, or edema noted. No calf tenderness. 


NEUROLOGICAL: Awake and alert. Cranial nerves II through XII intact.  Motor and 

sensory grossly within normal limits. Normal speech.


Laboratory





Laboratory Tests








Test


  18


22:45


 


White Blood Count 7.7 


 


Red Blood Count 4.32 


 


Hemoglobin 12.6 


 


Hematocrit 38.1 


 


Mean Corpuscular Volume 88.3 


 


Mean Corpuscular Hemoglobin 29.1 


 


Mean Corpuscular Hemoglobin


Concent 32.9 


 


 


Red Cell Distribution Width 14.1 


 


Platelet Count 215 


 


Mean Platelet Volume 9.6 


 


Neutrophils (%) (Auto) 60.9 


 


Lymphocytes (%) (Auto) 23.0 


 


Monocytes (%) (Auto) 6.8 


 


Eosinophils (%) (Auto) 8.6 


 


Basophils (%) (Auto) 0.7 


 


Neutrophils # (Auto) 4.7 


 


Lymphocytes # (Auto) 1.8 


 


Monocytes # (Auto) 0.5 


 


Eosinophils # (Auto) 0.7 


 


Basophils # (Auto) 0.1 


 


CBC Comment DIFF FINAL 


 


Differential Comment  


 


Blood Urea Nitrogen 19 


 


Creatinine 0.74 


 


Random Glucose 93 


 


Calcium Level 9.1 


 


Sodium Level 138 


 


Potassium Level 3.7 


 


Chloride Level 104 


 


Carbon Dioxide Level 30.6 


 


Anion Gap 3 


 


Estimat Glomerular Filtration


Rate 116 


 














 Date/Time


Source Procedure


Growth Status


 


 


 18 23:50


Blood Peripheral Aerobic Blood Culture


Pending Received


 


 18 23:50


Blood Peripheral Anaerobic Blood Culture


Pending Received





 18 22:45


Wound Breast Gram Stain


Pending Received


 


 18 22:45


Wound Breast Wound Culture


Pending Received








Result Diagram:  


18








Caprini VTE Risk Assessment


Caprini VTE Risk Assessment:  No/Low Risk (score <= 1)


Caprini Risk Assessment Model











 Point Value = 1          Point Value = 2  Point Value = 3  Point Value = 5


 


Age 41-60


Minor surgery


BMI > 25 kg/m2


Swollen legs


Varicose veins


Pregnancy or postpartum


History of unexplained or recurrent


   spontaneous 


Oral contraceptives or hormone


   replacement


Sepsis (< 1 month)


Serious lung disease, including


   pneumonia (< 1 month)


Abnormal pulmonary function


Acute myocardial infarction


Congestive heart failure (< 1 month)


History of inflammatory bowel disease


Medical patient at bed rest Age 61-74


Arthroscopic surgery


Major open surgery (> 45 min)


Laparoscopic surgery (> 45 min)


Malignancy


Confined to bed (> 72 hours)


Immobilizing plaster cast


Central venous access Age >= 75


History of VTE


Family history of VTE


Factor V Leiden


Prothrombin [id number]


Lupus anticoagulant


Anticardiolipin antibodies


Elevated serum homocysteine


Heparin-induced thrombocytopenia


Other congenital or acquired


   thrombophilia Stroke (< 1 month)


Elective arthroplasty


Hip, pelvis, or leg fracture


Acute spinal cord injury (< 1 month)








Prophylaxis Regimen











   Total Risk


Factor Score Risk Level Prophylaxis Regimen


 


0-1      Low Early ambulation


 


2 Moderate Order ONE of the following:


*Sequential Compression Device (SCD)


*Heparin 5000 units SQ BID


 


3-4 Higher Order ONE of the following medications:


*Heparin 5000 units SQ TID


*Enoxaparin/Lovenox 40 mg SQ daily (WT < 150 kg, CrCl > 30 mL/min)


*Enoxaparin/Lovenox 30 mg SQ daily (WT < 150 kg, CrCl > 10-29 mL/min)


*Enoxaparin/Lovenox 30 mg SQ BID (WT < 150 kg, CrCl > 30 mL/min)


AND/OR


*Sequential Compression Device (SCD)


 


5 or more Highest Order ONE of the following medications:


*Heparin 5000 units SQ TID (Preferred with Epidurals)


*Enoxaparin/Lovenox 40 mg SQ daily (WT < 150 kg, CrCl > 30 mL/min)


*Enoxaparin/Lovenox 30 mg SQ daily (WT < 150 kg, CrCl > 10-29 mL/min)


*Enoxaparin/Lovenox 30 mg SQ BID (WT < 150 kg, CrCl > 30 mL/min)


AND


*Sequential Compression Device (SCD)











Assessment and Plan


Assessment and Plan


Assessment/plan:





1.  Mastitis/breast abscess


Ultrasound of the right breast significant for mastitis and probable abscess of 

the right breast


Vancomycin/Zosyn


General surgery consulted to evaluate for possible I&D, appreciate 

recommendations


Percocet for pain





FEN


Nothing by mouth


Electrolytes: Replete when necessary


Ambulation


NS at 85 cc/hour











Hilda Winn MD 2018 01:36

## 2018-02-27 NOTE — HHI.DCPOC
Discharge Care Plan


Diagnosis:  


(1) Breast abscess


(2) Mastitis








Your Health Problems Are: Incision/Drains





 Inflammation





 Swelling








Goals to Promote Your Health


* To prevent worsening of your condition and complications


* To maintain your health at the optimal level


Directions to Meet Your Goals


*** Take your medications as prescribed


*** Follow your dietary instruction


*** Follow activity as directed








*** Keep your appointments as scheduled


*** Take your immunizations and boosters as scheduled


*** If your symptoms worsen call your PCP, if no PCP go to Urgent Care Center 

or Emergency Room***


*** Smoking is Dangerous to Your Health. Avoid second hand smoke***


***Call the 24-hour hour crisis hotline for domestic abuse at 1-179.937.7133***











Matt Perez Feb 27, 2018 13:44

## 2018-02-27 NOTE — MB
cc:

Omar Hugo MD

****

 

 

DATE OF CONSULT:

2018

 

REASON FOR CONSULTATION:

Right breast abscess.

 

HISTORY OF PRESENT ILLNESS:

The patient is a 27-year-old female relatively healthy who has a 

history of mastitis approximately several months ago.  The patient was

treated nonoperatively with antibiotics and had some improvement.  

However, states she developed approximately over a months period of 

time of recurrent increasing breast pain.  She is recently postpartum 

and was undergoing breast feeding previously, but was unable to due to

lack of expression of milk and mastitis development.  In the last 

24-hours, the patient states the pain has been a 10/10.  It is located

in the right inferolateral breast area near the nipple.  It is sharp. 

It is worse with palpation and movement, better with lying still.  She

had further evaluation including ultrasound showing a 3.2 cm abscess. 

Surgery was consulted.  Patient denies documented fevers, but does 

have subjective fevers.  She denies any chest pain or shortness of 

breath.

 

PAST MEDICAL HISTORY:

Mastitis

 

PAST SURGICAL HISTORY:

   1. Bilateral tubal ligation

   2.  x 3

 

MEDICATIONS:

See EMR.

 

ALLERGIES:

NO KNOWN DRUG ALLERGIES.

 

FAMILY HISTORY:

Denies coronary artery disease or diabetes.

 

SOCIAL HISTORY:

Denies smoking, ETOH or IVDA.

 

REVIEW OF SYSTEMS:

GENERAL:  Denies documented fevers.  Complained of subjective fevers.

HEENT:  Denies eye pain or ear pain.

NECK:  Denies swelling or pain.

BREAST:  Complains of right breast pain.

ABDOMEN:  Denies nausea or vomiting.

:  Denies dysuria or hematuria.

ENDOCRINE:  Denies polyuria or polydipsia.

INTEGUMENT:  Denies any masses or lesions.

NEUROLOGIC:  Denies numbness or tingling.

PSYCH:  Denies change in mood or sensorium.

 

PHYSICAL EXAM:

GENERAL:  The patient is in no acute distress.

VITAL SIGNS:  Temperature 98.8, pulse 68, respirations 14, blood 

pressure 118/76, saturation 100%.

HEENT:  Pupils equal, round and reactive.  Normocephalic, atraumatic.

NECK:  Supple.  Trachea midline.

LUNGS:  Clear to auscultation.  Bilateral expansion.

HEART:  S1 and S2 regular rhythm.

ABDOMEN:  Soft, nontender and nondistended.

BREASTS:  Right breast has tenderness on palpation.  Palpable abscess 

in the lower outer quadrant.  No adenopathy noted.  No skin changes 

noted.  No expression of nipple discharge. Left breast within normal 

limits.

EXTREMITIES:  Warm, well perfused.

NEUROLOGIC:  5/5 motor all extremities.  Sensation intact.

 

LABORATORY DIAGNOSTIC DATA:

WBC 7.7, hemoglobin 12.6, hematocrit 38.1, platelets 215.

 

Sodium 138, potassium 3.7, chloride 104, BUN 19, creatinine 0.7, 

calcium 9.1.

 

Ultrasound reviewed by myself showing a 3.2 cm loculated abscess in 

the 7 o'clock position approximately 3 cm from the nipple.

 

ASSESSMENT:

The patient is a 27-year-old female who has a right breast abscess.

 

PLAN:

After a full work up and data, patient with right breast abscess.  We 

will currently consult interventional radiology for ultrasound guided 

aspiration of abscess.  If they are unable to do this, then we will 

consider a surgical intervention and incision and drainage.  The 

patient needs antibiotics which she is currently on and agree with 

this.  The patient can be NPO and start a diet following the 

procedure.  She is on adequate pain control.  We will continue to 

follow the patient closely.

 

Thank you for this consultation.

 

 

 

__________________________________

MD KAMARI Viveros/YOANNA/rr

D: 2018, 09:12 AM

T: 2018, 11:09 AM

Visit #: [id number]

Job #: [id number]

## 2018-03-01 NOTE — HHI.PR
Addendum to Inpatient Note


Addendum Reason:  Additional Documentation


Additional Information


Follow up microbiology result staph aureus, spoke with microbiology staff Kassidy

, sensitive to doxycycline.  Will place patient on doxycycline 100mg BID x10 

days. Will DC previous antibiotic.  [facility] nurse notified to contact patient and 

pharmacy for new antibiotic.  Avoid prolonged sun exposure.











Matt Perez Mar 1, 2018 08:14

## 2018-04-02 ENCOUNTER — HOSPITAL ENCOUNTER (EMERGENCY)
Dept: HOSPITAL 17 - PHED | Age: 28
LOS: 1 days | Discharge: HOME | End: 2018-04-03
Payer: MEDICAID

## 2018-04-02 VITALS
TEMPERATURE: 98.1 F | OXYGEN SATURATION: 100 % | HEART RATE: 88 BPM | SYSTOLIC BLOOD PRESSURE: 120 MMHG | RESPIRATION RATE: 18 BRPM | DIASTOLIC BLOOD PRESSURE: 66 MMHG

## 2018-04-02 DIAGNOSIS — N61.0: Primary | ICD-10-CM

## 2018-04-02 PROCEDURE — 99284 EMERGENCY DEPT VISIT MOD MDM: CPT

## 2018-04-03 VITALS — DIASTOLIC BLOOD PRESSURE: 74 MMHG | SYSTOLIC BLOOD PRESSURE: 122 MMHG

## 2018-04-03 NOTE — PD
HPI


Chief Complaint:  Skin Problem


Time Seen by Provider:  23:50


Travel History


International Travel<30 days:  No


Contact w/Intl Traveler<30days:  No


Traveled to known affect area:  No





History of Present Illness


HPI


27-year-old woman presents emergency department complaining of left breast pain 

tenderness and swelling.  Symptoms started today.  History of breast abscess in 

the past.  Not lactating.  No other complaints.





History


Past Medical History


Medical History:  Denies Significant Hx


LMP:  3/30/18


:  6


Para:  5





Social History


Alcohol Use:  Yes (occ)


Tobacco Use:  No





Allergies-Medications


(Allergen,Severity, Reaction):  


Coded Allergies:  


     No Known Allergies (Verified  Adverse Reaction, Unknown, 18)


Reported Meds & Prescriptions





Reported Meds & Active Scripts


Active


Doxycycline Hyclate DR (Doxycycline Hyclate) 100 Mg Tab 100 Mg PO BID 10 Days








Review of Systems


Except as stated in HPI:  all other systems reviewed are Neg





Physical Exam


Narrative


GENERAL: Well-appearing 27-year-old woman, no acute distress.


SKIN: Warm and dry.


CARDIOVASCULAR: Warm and well perfused.


RESPIRATORY: Normal rate and effort.


Breast: Normal symmetric appearance of both breasts.  On the left breast, in 

the 7 o'clock position, there is a small 2 cm area of induration.  There is no 

fluctuance.  There is no appreciable erythema warmth or redness.  No drainage 

from the breast.


NEUROLOGICAL: Awake and alert.  No gross deficits.





Data


Data


Last Documented VS





Vital Signs








  Date Time  Temp Pulse Resp B/P (MAP) Pulse Ox O2 Delivery O2 Flow Rate FiO2


 


18 23:28 98.1 88 18 120/66 (84) 100   








Orders





 Orders


Ed Poc Ultrasound (18 )


Lidocaine 1% Inj (50 Ml) (Xylocaine 1% I (4/3/18 00:00)








MDM


Medical Decision Making


Medical Screen Exam Complete:  Yes


Emergency Medical Condition:  Yes


Differential Diagnosis


Abscess, phlegmon, malignancy, infection, other


Narrative Course


Medical decision making





27-year-old woman with pain and tenderness in the left breast, started just 

today, she denies any nodules or tenderness or masses there prior to today.  

She had similar problems in the right breast.  Looks otherwise well.  I do not 

see a drainable fluid collection, there is a focal area of nodularity.  

Recommend antibiotics at this point, and then follow-up if symptoms persist.





Procedures


**Procedure Narrative**


Point-of-care ultrasound:


Focused soft tissue ultrasound performed May the bedside to evaluate for 

drainable fluid collection.  No drainable fluid collection was identified in 

the left breast.





Diagnosis





 Primary Impression:  


 Cellulitis of left breast


Patient Instructions:  General Instructions





***Additional Instructions:  


Take antibiotics as prescribed.





Take naproxen as prescribed.





Return to the emergency department for any new or worsening symptoms.





Follow-up with your primary doctor in 5 days if symptoms are not completely 

resolved, or if you have any persistent masses or nodules in your breast.


***Med/Other Pt SpecificInfo:  Prescription(s) given


Scripts


Naproxen (Naproxen) 500 Mg Tab


500 MG PO BID for 7 Days, #14 TAB 0 Refills


   Prov: Gabriel Bauer MD         4/3/18 


Doxycycline Hyclate DR (Doxycycline Hyclate DR) 100 Mg Tab


100 MG PO BID for Infection for 10 Days, #20 TAB 0 Refills


   Prov: Gabriel Bauer MD         4/3/18


Disposition:  01 DISCHARGE HOME


Condition:  Stable











Gabriel Bauer MD Apr 3, 2018 00:11

## 2018-05-16 ENCOUNTER — HOSPITAL ENCOUNTER (EMERGENCY)
Dept: HOSPITAL 17 - NEPE | Age: 28
Discharge: HOME | End: 2018-05-16
Payer: MEDICAID

## 2018-05-16 VITALS — HEIGHT: 59 IN | BODY MASS INDEX: 33.33 KG/M2 | WEIGHT: 165.35 LBS

## 2018-05-16 VITALS
SYSTOLIC BLOOD PRESSURE: 118 MMHG | OXYGEN SATURATION: 100 % | DIASTOLIC BLOOD PRESSURE: 82 MMHG | HEART RATE: 59 BPM | RESPIRATION RATE: 18 BRPM | TEMPERATURE: 98.5 F

## 2018-05-16 DIAGNOSIS — T22.011A: ICD-10-CM
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
## 2018-05-16 DIAGNOSIS — T23.022A: ICD-10-CM

## 2018-05-16 DIAGNOSIS — Z79.899: ICD-10-CM

## 2018-05-16 DIAGNOSIS — T20.09XA: Primary | ICD-10-CM
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
## 2018-05-16 PROCEDURE — 16025 DRESS/DEBRID P-THICK BURN M: CPT
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
## 2018-06-21 ENCOUNTER — HOSPITAL ENCOUNTER (EMERGENCY)
Dept: HOSPITAL 17 - NEPD | Age: 28
Discharge: HOME | End: 2018-06-21
Payer: MEDICAID

## 2018-06-21 VITALS — HEIGHT: 59 IN | BODY MASS INDEX: 33.33 KG/M2 | WEIGHT: 165.35 LBS

## 2018-06-21 VITALS — TEMPERATURE: 98.4 F | DIASTOLIC BLOOD PRESSURE: 72 MMHG | SYSTOLIC BLOOD PRESSURE: 127 MMHG | OXYGEN SATURATION: 98 %

## 2018-06-21 DIAGNOSIS — Z79.899: ICD-10-CM
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
## 2018-06-21 DIAGNOSIS — S70.11XA: ICD-10-CM

## 2018-06-21 DIAGNOSIS — Z88.2: ICD-10-CM

## 2018-06-21 DIAGNOSIS — S76.911A: Primary | ICD-10-CM

## 2018-06-21 PROCEDURE — 99284 EMERGENCY DEPT VISIT MOD MDM: CPT

## 2018-06-21 PROCEDURE — 96375 TX/PRO/DX INJ NEW DRUG ADDON: CPT

## 2018-06-21 PROCEDURE — 96374 THER/PROPH/DIAG INJ IV PUSH: CPT

## 2018-06-21 PROCEDURE — 73552 X-RAY EXAM OF FEMUR 2/>: CPT

## 2018-06-21 PROCEDURE — 73502 X-RAY EXAM HIP UNI 2-3 VIEWS: CPT

## 2018-06-21 NOTE — PD
HPI


Chief Complaint:  Fall


Time Seen by Provider:  02:00


Travel History


International Travel<30 days:  No


Contact w/Intl Traveler<30days:  No


Traveled to known affect area:  No





History of Present Illness


HPI


This is a 27-year-old female who presents for evaluation after mechanical fall.

  She reports that this afternoon she fell off of a however board and landed on 

the concrete on her right buttock/thigh region.  She now has pain in her 

proximal right thigh/right buttock/right hip region which is sore, aching, 

constant, worse with movement.  She denies any other injuries and she has no 

other complaints at this time.





[facility]


Past Medical History


Medical History:  Denies Significant Hx


Cardiovascular Problems:  No


Chemotherapy:  No


Cerebrovascular Accident:  No


Diabetes:  No


Diminished Hearing:  No


Respiratory:  Yes (pna as a child)


Immunizations Current:  Yes


Tetanus Vaccination:  < 5 Years


Influenza Vaccination:  No


Pregnant?:  Not Pregnant


LMP:  18


:  6


Para:  5


Miscarriage:  0


:  1


Tubal Ligation:  Yes





Past Surgical History


 Section:  Yes (x3)


Other Surgery:  Yes (c section 2017)





Social History


Alcohol Use:  Yes (occ)


Tobacco Use:  No


Substance Use:  No





Allergies-Medications


(Allergen,Severity, Reaction):  


Coded Allergies:  


     Sulfa (Sulfonamide Antibiotics) (Verified  Allergy, Severe, Hives, 18)


Reported Meds & Prescriptions





Reported Meds & Active Scripts


Active


Ibuprofen 800 Mg Tab 800 Mg PO Q6HR PRN


Baclofen 10 Mg Tab 10 Mg PO Q8HR 10 Days


Silvadene Topical (Silver Sulfadiazine) 1 % Cream 1 Applic TOPICAL BID 7 Days


Bacitracin Topical 500 Unit/Gm Oint 1 Applic TOPICAL BID 7 Days








Review of Systems


Except as stated in HPI:  all other systems reviewed are Neg





Physical Exam


Narrative


GENERAL: Well-developed well-nourished female no acute distress


SKIN: Warm and dry.  No open wounds.  No bruising or soft tissue swelling.


HEAD: Atraumatic. Normocephalic. 


EYES: Pupils equal and round. No scleral icterus. No injection or drainage. 


ENT: No nasal bleeding or discharge.  Mucous membranes pink and moist.


NECK: Trachea midline. No JVD. 


CARDIOVASCULAR: Regular rate and rhythm.  No murmur appreciated.


RESPIRATORY: No accessory muscle use. Clear to auscultation. Breath sounds 

equal bilaterally. 


GASTROINTESTINAL: Abdomen soft, non-tender, nondistended. Hepatic and splenic 

margins not palpable. 


MUSCULOSKELETAL: No obvious deformities.  There is some tenderness to palpation 

to the lateral proximal right thigh and hip.  There is pain with flexion and 

extension and rotation of the right hip.  Distal pulses and sensation are 

preserved.  There is no tenderness to palpation along the cervical thoracic or 

lumbar midline spine.


NEUROLOGICAL: Awake and alert. No obvious cranial nerve deficits.  Motor 

grossly within normal limits. Normal speech.





Data


Data


Last Documented VS





Vital Signs








  Date Time  Temp Pulse Resp B/P (MAP) Pulse Ox O2 Delivery O2 Flow Rate FiO2


 


18 01:47 98.4 82 16 127/72 (90) 98   








Orders





 Orders


Hip, Uni(Ap&Lat) W Ap Pelvis (18 )


Femur (Ap & Lat/2vws) (18 )


Ketorolac Inj (Toradol Inj) (18 02:00)


Orphenadrine Inj (Norflex Inj) (18 02:00)


Orphenadrine Inj (Norflex Inj) (18 02:15)


Ketorolac Inj (Toradol Inj) (18 02:15)


Iv Access Insert/Monitor (18 02:09)








[facility]


Medical Decision Making


Medical Screen Exam Complete:  Yes


Emergency Medical Condition:  Yes


Medical Record Reviewed:  Yes


Differential Diagnosis


Contusion, sprain, fracture


Narrative Course


X-ray imaging of the pelvis, hip and right femur were obtained and they are all 

negative.  The patient appears to have a contusion and strain to her right leg.

  She is stable for discharge.





Diagnosis





 Primary Impression:  


 Strain of right hip and thigh


 Additional Impression:  


 Contusion of right thigh





***Additional Instructions:  


Medication as needed.  Do not drive or drink alcohol and taking baclofen.  

Avoid strenuous activity.  Rest.  Ice pack several times a day 15 minutes at a 

time.  Follow-up with primary care physician in 2 weeks.  Return for any 

emergent medical conditions.


***Med/Other Pt SpecificInfo:  Prescription(s) given


Scripts


Ibuprofen (Ibuprofen) 800 Mg Tab


800 MG PO Q6HR Y for PAIN, #40 TAB 0 Refills


   Prov: Dillon Craig MD         18 


Baclofen (Baclofen) 10 Mg Tab


10 MG PO Q8HR for 10 Days, TAB 0 Refills


   Prov: Dillon Craig MD         18


Disposition:  01 DISCHARGE HOME


Condition:  Stable











Lloyd Linares 2018 02:04

## 2018-06-21 NOTE — RADRPT
EXAM DATE:  6/21/2018 2:27 AM EDT

AGE/SEX:        27 years / Female



INDICATIONS:  Pain in right hip from falling off hoverboard.



CLINICAL DATA:  This is the patient's initial encounter. Patient reports that signs and symptoms have
 been present for 1 day and indicates a pain score of 5/10. 

                                                                          

MEDICAL/SURGICAL HISTORY:       None. None.



COMPARISON:      No prior exams available for comparison. 





FINDINGS:  

Bony structures are intact and in normal alignment.  Joints are intact without dislocation or signifi
cant arthropathy.  Osseous density is normal.  Soft tissues are unremarkable.  No radiopaque foreign 
bodies seen.  



CONCLUSION: 

Negative examination



 







Electronically signed by: Gabriel Adamson MD  6/21/2018 2:28 AM EDT

## 2018-06-21 NOTE — RADRPT
EXAM DATE:  6/21/2018 2:29 AM EDT

AGE/SEX:        27 years / Female



INDICATIONS:  Pain in right leg from falling off hoverboard.



CLINICAL DATA:  This is the patient's initial encounter. Patient reports that signs and symptoms have
 been present for 1 day and indicates a pain score of 5/10. 

                                                                          

MEDICAL/SURGICAL HISTORY:       None. None.



COMPARISON:      No prior exams available for comparison. 





FINDINGS:  

Bony structures are intact and in normal alignment. Osseous density is normal.  Soft tissues are unre
markable.  No radiopaque foreign bodies seen.   



CONCLUSION: 

Negative examination









Electronically signed by: Gabriel Adamson MD  6/21/2018 2:36 AM EDT

## 2018-06-24 ENCOUNTER — HOSPITAL ENCOUNTER (EMERGENCY)
Dept: HOSPITAL 17 - NEPD | Age: 28
Discharge: HOME | End: 2018-06-24
Payer: MEDICAID

## 2018-06-24 VITALS — DIASTOLIC BLOOD PRESSURE: 68 MMHG | SYSTOLIC BLOOD PRESSURE: 118 MMHG | HEART RATE: 80 BPM

## 2018-06-24 VITALS
HEART RATE: 91 BPM | DIASTOLIC BLOOD PRESSURE: 56 MMHG | RESPIRATION RATE: 16 BRPM | SYSTOLIC BLOOD PRESSURE: 120 MMHG | OXYGEN SATURATION: 100 % | TEMPERATURE: 98.3 F

## 2018-06-24 VITALS — WEIGHT: 160.94 LBS | BODY MASS INDEX: 32.44 KG/M2 | HEIGHT: 59 IN

## 2018-06-24 DIAGNOSIS — L50.9: ICD-10-CM

## 2018-06-24 DIAGNOSIS — R06.02: ICD-10-CM

## 2018-06-24 DIAGNOSIS — Z88.2: ICD-10-CM

## 2018-06-24 DIAGNOSIS — T42.8X5A: Primary | ICD-10-CM

## 2018-06-24 PROCEDURE — 96374 THER/PROPH/DIAG INJ IV PUSH: CPT

## 2018-06-24 PROCEDURE — 96372 THER/PROPH/DIAG INJ SC/IM: CPT

## 2018-06-24 PROCEDURE — 96375 TX/PRO/DX INJ NEW DRUG ADDON: CPT

## 2018-06-24 PROCEDURE — 99284 EMERGENCY DEPT VISIT MOD MDM: CPT

## 2018-06-24 NOTE — PD
HPI


Chief Complaint:  Allergic/Adverse Reaction


Time Seen by Provider:  04:13


Travel History


International Travel<30 days:  No


Contact w/Intl Traveler<30days:  No


Traveled to known affect area:  No





History of Present Illness


HPI


27-year-old black female presents emergency department with complaints of 

allergic reaction.  She had taken 1 dose of baclofen which she had received for 

a contusion to her hip.  Patient states that she developed severe pruritus, 

hives.  She states that she had some mild associated shortness of breath.  She 

denies glossal edema.  Difficulty swallowing.  Symptoms are moderate.  

Exacerbated by baclofen.  No alleviating symptoms.





PFSH


Past Medical History


Cardiovascular Problems:  No


Chemotherapy:  No


Cerebrovascular Accident:  No


Diabetes:  No


Diminished Hearing:  No


Respiratory:  Yes (pna as a child)


Immunizations Current:  Yes


Pregnant?:  Not Pregnant


:  6


Para:  5


Miscarriage:  0


:  1


Tubal Ligation:  Yes





Past Surgical History


 Section:  Yes (x3)


Other Surgery:  Yes (c section 2017)





Social History


Alcohol Use:  Yes (occ)


Tobacco Use:  No


Substance Use:  No





Allergies-Medications


(Allergen,Severity, Reaction):  


Coded Allergies:  


     Sulfa (Sulfonamide Antibiotics) (Verified  Allergy, Severe, Hives, 18)


Reported Meds & Prescriptions





Reported Meds & Active Scripts


Active


Ibuprofen 800 Mg Tab 800 Mg PO Q6HR PRN


Baclofen 10 Mg Tab 10 Mg PO Q8HR 10 Days








Review of Systems


Except as stated in HPI:  all other systems reviewed are Neg





Physical Exam


Narrative


GENERAL:  Well-developed, well-nourished in no apparent distress. Nontoxic 

appearing.


HEAD: Normocephalic, atraumatic.


EYES: Pupils equal round and reactive. Extraocular motions intact. No scleral 

icterus. No injection or drainage. 


ENT: Nose clear. Throat without erythema, tonsillar hypertrophy or exudate. 

Uvula midline. Airway patent.  No glossal edema.  Speaking in full and complete 

sentences


NECK: Trachea midline. Supple, nontender, moves head freely.  No central bony 

tenderness or spasm.


CARDIOVASCULAR: Regular rate and rhythm without murmurs, gallops, or rubs. 


RESPIRATORY: Clear to auscultation. Breath sounds equal bilaterally. No wheezes

, rales, or rhonchi.  


GASTROINTESTINAL: Abdomen soft, non-tender, nondistended. No hepato-splenomegaly

, or palpable masses. No guarding.


EXTREMITIES: No clubbing, cyanosis, or edema. No joint tenderness.


BACK: Nontender without deformity. No flank tenderness.


NEUROLOGICAL: Awake, alert and oriented x 3 .Cranial nerves grossly intact.  

Motor and sensory grossly within normal limits. Normal speech.


Skin: Patient has diffuse hives.





Data


Data


Last Documented VS





Vital Signs








  Date Time  Temp Pulse Resp B/P (MAP) Pulse Ox O2 Delivery O2 Flow Rate FiO2


 


18 04:37  80  118/68    


 


18 04:06 98.3  16  100   








Orders





 Orders


Ecg Monitoring (18 04:14)


Iv Access Insert/Monitor (18 04:14)


Oximetry (18 04:14)


Diphenhydramine Inj (Benadryl Inj) (18 04:15)


Methylprednisolone So Succ Inj (Solumedr (18 04:15)


Famotidine Inj (Pepcid Inj) (18 04:15)


Sodium Chloride 0.9% Flush (Ns Flush) (18 04:15)


Epinephrine (1:1000) Inj (Adrenalin (1:1 (18 04:15)








MDM


Medical Decision Making


Medical Screen Exam Complete:  Yes


Emergency Medical Condition:  Yes


Medical Record Reviewed:  Yes


Differential Diagnosis


Differential diagnosis: Allergic reaction, contact dermatitis, cellulitis


Narrative Course


IV access is obtained.  Patient was given epinephrine 0.3IM, Solu-Medrol 125 mg 

IV, Benadryl 50 mg's IV, and Pepcid 20 mg IV





Patient is reexamined.  She has had complete resolution of her rash.  She is 

feeling back to normal.  Her pruritus has resolved.  The patient will be 

monitored for the next hour.  If she maintains resolution of rash.  The patient 

may be discharged.  





At 0520The patient has had resolution and has maintained resolution.  She is 

medically stable for discharge.





Diagnosis





 Primary Impression:  


 Acute allergic reaction to baclofen


Patient Instructions:  General Instructions





***Additional Instructions:  


Rest.


Avoid baclofen in the future.


50 mg of Benadryl every 4-6 hours as needed.


Pepcid and prednisone.


Recheck with her primary care doctor in the next 2-3 days.


Return to the ER if any problems.


***Med/Other Pt SpecificInfo:  Prescription(s) given


Disposition:  01 DISCHARGE HOME


Condition:  Stable











Keelen,Gil T. PA 2018 05:18

## 2025-05-13 NOTE — HHI.HP
History & Physical


H&P





HPI


Travel History


International Travel<30 Days:  No


Contact w/Intl Traveler<30Days:  No


Known Affected Area:  No





History of Present Illness


HPI


This patient is a 26-year-old  6 para 5 EDC is 2017 at 32 

weeks and 1 days prenatal care with the [facility] and Dr. Charles, she 

presents the chief complaint of contractions.


The patient states the contractions began about 1 hour ago no rupture of 

membranes no vaginal bleeding the babies are active


Patient has a diamniotic dichorionic twin gestation both she states are vertex 

vertex


She has not time the contractions are very irregular hurts more when the baby 

moves improves when she is at rest


Denies any problems during the pregnancy


No headaches no blurred vision no nausea no vomiting mild diarrhea no 

constipation urinary frequency no urgency or hesitancy or feeling like she has 

a urinary tract infection


Previously admitted at [facility] for  contractions she was given 

terbutaline in the triage area 2 doses of steroids she states she got one shot 

and had to come back 24 hours later for the second shot.  This was done 

approximately 4 weeks ago, at 2 8 weeks per the patient.





Patient states that she is on Procardia 4 times a day








Prenatal records received from [facility] and reviewed EDC is 2017 placing the patient at 32 weeks and 1 day





 History (Limited) 


History


Past Medical History


*** Narrative Medical


No known drug allergies history of an allergy to dust previous urinary tract 

infections


Seen for  contractions





Obstetric History


Obstetric History


First baby born 2009 female infant weight 6 lbs. 10 oz. vaginal 

delivery at term


Second baby born 2011 male infant weight 6 lbs. 4 oz. vaginal delivery 

at term


Third baby born 2012 female infant 5 lbs. 4 oz. born at 38 weeks


 #4 baby 2014 male infant weight approximately 5 pounds delivered by 

primary  secondary to nonreassuring fetal heart tracing


Fifth baby born 2016 female infant weight 6 lbs. 2 oz. repeat 





This twin gestation patient is scheduled for repeat  she states 







Past Surgical History


*** Narrative Surgical


 2





Family History


*** Narrative Family History


Father with hypertension





Social History


Alcohol Use:  No


Tobacco Use:  No


Substance Abuse:  No





 Allergies-Medications 


Allergies-Medications


(Allergen,Severity, Reaction):  


Coded Allergies:  


     No Known Allergies (Unverified , 17)


Home Meds


Active Scripts


Prednisone (Deltasone) 20 Mg Tab, 20 MG PO BID, #10 TAB


   Prov:Gracia Webb MD         17





 ROS 


Review of Systems


Gastrointestinal:  Abdominal Pain (irregular contractions)





 Physical Exam 


Physical Exam


Narrative


GENERAL: Well-nourished, well-developed patient.  Alert oriented 3 and 

cooperative in no acute distress


SKIN: Warm and dry.


HEAD: Normocephalic and atraumatic.


EYES: No scleral icterus. No injection or drainage. 


ENT: No nasal drainage noted. Mucous membranes pink. Airway patent.


NECK: Supple, trachea midline. No JVD.


CARDIOVASCULAR: Regular rate and rhythm without murmurs, gallops, or rubs. 


RESPIRATORY: Breath sounds equal bilaterally. No accessory muscle use.


ABDOMEN/GI: Gravid size greater than date secondary to twin gestation mild 

palpable contractions


   Gravid to [-] weeks size term size


   Fundal Height: [-]


GENITOURINARY: Speculum exam is done no fluid no blood thin white discharge 

cervix not visibly dilated


   External Genitalia: intact and normal in appearance


   BUS glands: [-]


   Cervix: [-] Posterior firm


   Dilatation: [-] Fingertip         


   Effacement: [-] Thick         


   Station: [-] First presenting part ballotable  


   Presentation: [-] Vertex/vertex per the patient       


   Membranes: [intact 


   Uterine Contractions: [-] Irregular


FHT's: 


   Category: [-]  1 


   Baseline: [-]  150/148 


   Reactive: [-]  Positive 


   Variability: [-] Moderate variability 


   Decels: [-]  0


EXTREMITIES: No cyanosis or edema.  2+ reflexes


NEUROLOGICAL: Awake and alert. Motor and sensory grossly within normal limits. 

Five out of 5 muscle strength in all muscle groups. Normal speech.





 Data 


Vital Signs Reviewed:  Yes (blood pressures 120/77 pulse is 89 temperature is 

98.6)


Orders





 Orders


Vital Signs (Adult) .ON ADMISSION (17 21:21)


^ Labor Status (17 21:21)





 [facility]


Medical Record Reviewed:  Yes


Interpretation(s)


26-year-old  9 para 5035


Not in active labor


Diamniotic dichorionic twin gestation


Vertex vertex per the patient


[place] [facility]


Rule out UTI


Rule out  labor


Narrative Course / MDM


Patient continues to have irregular contractions


mild


 states they have spaced out


will continue on the procardia 10 mg po q 6 hours


observations x 23 hours


both twins are category one


 iv fluid hydration


Visteril 50 mg po


 The cervix have been unchanged


ffn is negative 


culture not indicated on the urine





 if cervix is unchanged in the am will discharge home


Plan


Plan;


External fetal monitoring


IV fluid hydration with lactated Ringer's 125 cc an hour bolus 300 cc


Fetal fibronectin


Urinalysis





Reevaluation


Dose of Procardia 10 mg by mouth











Hazel Marquez MD Aug 20, 2017 23:49 supervision/verbal cues/nonverbal cues (demo/gestures)